# Patient Record
Sex: FEMALE | Race: BLACK OR AFRICAN AMERICAN | Employment: FULL TIME | URBAN - METROPOLITAN AREA
[De-identification: names, ages, dates, MRNs, and addresses within clinical notes are randomized per-mention and may not be internally consistent; named-entity substitution may affect disease eponyms.]

---

## 2017-05-11 NOTE — TELEPHONE ENCOUNTER
Pt appointment for physical rescheduled from 6/2/17 to 6/29/17 due to Dr. Prakash Cruz being on vacation. Please send refill to [facility] pharmacy.

## 2017-05-11 NOTE — TELEPHONE ENCOUNTER
From: Lauren Celaya  To: Juana Goins NP  Sent: 5/11/2017 10:25 AM EDT  Subject: Medication Renewal Request    Original authorizing provider: LAURA Shepard would like a refill of the following medications:  Abdiel Vidales 1/35, 28, 1-35 mg-mcg tab Juana Goins NP]    Preferred pharmacy: [facility]/PHARMACY #[id number] [address] AT CORNER OF [address] [place] [address]    Comment:

## 2017-05-12 NOTE — TELEPHONE ENCOUNTER
Pt called to check on status of refill request since she received 3 messages from [address] and 2 of them said her prescription had been refused. Pt advised one of them may have been refused by NP since it was sent to him in err and Dr. Rosalia Ward may have refused initial refill due to not realizing her appointment has already been scheduled for annual physical but was approved today.

## 2017-06-29 ENCOUNTER — OFFICE VISIT (OUTPATIENT)
Dept: FAMILY MEDICINE CLINIC | Age: 52
End: 2017-06-29

## 2017-06-29 VITALS
HEART RATE: 70 BPM | SYSTOLIC BLOOD PRESSURE: 137 MMHG | HEIGHT: 66 IN | RESPIRATION RATE: 20 BRPM | DIASTOLIC BLOOD PRESSURE: 81 MMHG | WEIGHT: 165 LBS | TEMPERATURE: 98 F | BODY MASS INDEX: 26.52 KG/M2

## 2017-06-29 DIAGNOSIS — E78.00 HYPERCHOLESTEREMIA: ICD-10-CM

## 2017-06-29 DIAGNOSIS — D50.9 IRON DEFICIENCY ANEMIA, UNSPECIFIED IRON DEFICIENCY ANEMIA TYPE: ICD-10-CM

## 2017-06-29 DIAGNOSIS — Z23 ENCOUNTER FOR IMMUNIZATION: ICD-10-CM

## 2017-06-29 DIAGNOSIS — R10.10 PAIN OF UPPER ABDOMEN: ICD-10-CM

## 2017-06-29 DIAGNOSIS — I10 BENIGN ESSENTIAL HTN: ICD-10-CM

## 2017-06-29 DIAGNOSIS — Z12.11 SCREEN FOR COLON CANCER: ICD-10-CM

## 2017-06-29 DIAGNOSIS — Z01.419 ENCOUNTER FOR GYNECOLOGICAL EXAMINATION WITHOUT ABNORMAL FINDING: Primary | ICD-10-CM

## 2017-06-29 NOTE — PROGRESS NOTES
Subjective:   Breanna Rich is a 49 y.o. y.o. female here for her annual routine Pap and checkup. She has been having upper, dull, abdominal pain for about 3 weeks to a month. It happens about once a week, usually when she eats greasy foods. No radiation. It is described as a discomfort. The pain is getting better. Patient's last menstrual period was 06/05/2017. Social History: single partner, contraception - OCP (Oral Contraceptive Pills). Pertinent past medical hstory: hypertension, no history of DVT, CAD, DM, liver disease, migraines or smoking. Health Habits/Lifestyle  Occupation:    Household members:  3, patient, spouse, son  Doing a monthly breast self exam:  no  Last dental appointment:   May 24th  Last eye exam:  April 10th  Last colonoscopy:  never  Uses seatbelts regularly :  yes  Getting regular exercise:  yes  Last mammogram:  This past October    Patient Active Problem List    Diagnosis Date Noted    Overweight 02/15/2016    Benign essential HTN 06/10/2015    Sickle cell trait ([facility] [address] 11/28/2012    Allergic rhinitis, cause unspecified 11/28/2012    Iron deficiency anemia 11/28/2012    Hypercholesteremia 09/05/2012    Asthma 09/05/2012     Current Outpatient Prescriptions   Medication Sig Dispense Refill    norethindrone-ethinyl estradiol (ALYACEN 1/35, 28,) 1-35 mg-mcg tab Take 1 Tab by mouth daily. 84 Tab 0    hydroCHLOROthiazide (MICROZIDE) 12.5 mg capsule TAKE ONE CAPSULE BY MOUTH DAILY 90 Cap 3    FOLIC ACID/MULTIVIT-MINERALS (DAILY GUMMIES PO) Take  by mouth daily.  LACTOBAC CMB #3/FOS/PANTETHINE (PROBIOTIC & ACIDOPHILUS PO) Take  by mouth.  fluticasone (FLONASE) 50 mcg/actuation nasal spray 2 Sprays by Both Nostrils route daily. 1 Bottle 11    albuterol (PROAIR HFA) 90 mcg/actuation inhaler Take 2 Puffs by inhalation every four (4) hours as needed for Wheezing.  1 Inhaler 3     Allergies   Allergen Reactions    Latex Rash    Papaya Swelling Swelling of lips.  Sulfa (Sulfonamide Antibiotics) Swelling     Past Medical History:   Diagnosis Date    Allergic rhinitis, cause unspecified 11/28/2012    Asthma 9/5/2012    Benign essential HTN 6/10/2015    Hypercholesteremia 9/5/2012    Iron deficiency anemia 11/28/2012    Overweight 2/15/2016    Sickle cell trait ([facility] [address] 11/28/2012     Past Surgical History:   Procedure Laterality Date    HX GYN      Cervical cryo     Family History   Problem Relation Age of Onset    Heart Disease Mother 79     CABG    Hypertension Mother     Asthma Brother     Cancer Maternal Grandmother      Ovarian     Social History   Substance Use Topics    Smoking status: Never Smoker    Smokeless tobacco: Never Used    Alcohol use No        ROS:  Feeling well. No dyspnea or chest pain on exertion. No change in bowel habits, black or bloody stools. No urinary tract symptoms. GYN ROS: normal menses, no abnormal bleeding, pelvic pain or discharge, no breast pain or new or enlarging lumps on self exam. No neurological complaints. Objective:  Visit Vitals    /81    Pulse 70    Temp 98 °F (36.7 °C) (Oral)    Resp 20    Ht 5' 5.75\" (1.67 m)    Wt 165 lb (74.8 kg)    LMP 06/05/2017    BMI 26.83 kg/m2     The patient appears well, alert, oriented x 3, in no distress. ENT normal.  Neck supple. No adenopathy or thyromegaly. PAVAN. Lungs are clear, good air entry, no wheezes, rhonchi or rales. S1 and S2 normal, no murmurs, regular rate and rhythm. Abdomen soft without tenderness, guarding, mass or organomegaly. Extremities show no edema, normal peripheral pulses. Neurological is normal, no focal findings. BREAST EXAM: breasts appear normal, no suspicious masses, no skin or nipple changes or axillary nodes    PELVIC EXAM: normal external genitalia, vulva, vagina, cervix, uterus and adnexa  Rectal - normal rectal, no masses      Assessment/Plan:    ICD-10-CM ICD-9-CM    1.  Encounter for gynecological examination without abnormal finding Z01.419 V72.31 PAP IG, HPV AND RFX HPV 64/41,35([id number])   2. Pain of upper abdomen R10.10 789.09 US ABD COMP   3. Screen for colon cancer Z12.11 V76.51 OCCULT BLOOD, IMMUNOASSAY (FIT)      REFERRAL TO GASTROENTEROLOGY   4. Encounter for immunization Z23 V03.89 diph,Pertuss,Acell,,Tet Vac-PF (ADACEL) 2 Lf-(2.5-5-3-5 mcg)-5Lf/0.5 mL susp      pneumococcal 23-valent (PNEUMOVAX 23) 25 mcg/0.5 mL injection   5. Benign essential HTN S89 407.3 METABOLIC PANEL, COMPREHENSIVE   6. Hypercholesteremia V40.11 028.4 METABOLIC PANEL, COMPREHENSIVE      LIPID PANEL   7. Iron deficiency anemia, unspecified iron deficiency anemia type D50.9 280.9 CBC WITH AUTOMATED DIFF         Breast awareness  Colonoscopy  Labs per orders. Abdominal ultrasound  TDAP and Pneumovax at pharmacy    Follow-up Disposition:  Return in about 6 months (around 12/29/2017) for blood pressure. .      Reviewed plan of care. Patient has provided input and agrees with goals.

## 2017-06-29 NOTE — MR AVS SNAPSHOT
Visit Information Date & Time Provider Department Dept. Phone Encounter #  
 6/29/2017  8:15 AM Andrea FarrellElenita 34 [id number] Follow-up Instructions Return in about 6 months (around 12/29/2017) for blood pressure. Upcoming Health Maintenance Date Due Pneumococcal 19-64 Medium Risk (1 of 1 - PPSV23) 9/16/1985 DTaP/Tdap/Td series (1 - Tdap) 9/16/1987 FOBT Q 1 YEAR AGE 50-75 5/31/2017 INFLUENZA AGE 9 TO ADULT 8/1/2017 BREAST CANCER SCRN MAMMOGRAM 10/27/2018 PAP AKA CERVICAL CYTOLOGY 5/31/2019 Allergies as of 6/29/2017  Review Complete On: 6/29/2017 By: Andrea Farrell MD  
  
 Severity Noted Reaction Type Reactions Latex  01/20/2012    Rash Papaya  01/20/2012    Swelling Swelling of lips. Sulfa (Sulfonamide Antibiotics)  01/20/2012    Swelling Current Immunizations  Never Reviewed No immunizations on file. Not reviewed this visit You Were Diagnosed With   
  
 Codes Comments Encounter for gynecological examination without abnormal finding    -  Primary ICD-10-CM: X75.936 ICD-9-CM: V72.31 Pain of upper abdomen     ICD-10-CM: R10.10 ICD-9-CM: 789.09 Screen for colon cancer     ICD-10-CM: Z12.11 ICD-9-CM: V76.51 Encounter for immunization     ICD-10-CM: O40 ICD-9-CM: V03.89 Benign essential HTN     ICD-10-CM: I10 
ICD-9-CM: 401.1 Hypercholesteremia     ICD-10-CM: E78.00 ICD-9-CM: 272.0 Iron deficiency anemia, unspecified iron deficiency anemia type     ICD-10-CM: D50.9 ICD-9-CM: 280.9 Vitals BP Pulse Temp Resp Height(growth percentile) Weight(growth percentile) 137/81 70 98 °F (36.7 °C) (Oral) 20 5' 5.75\" (1.67 m) 165 lb (74.8 kg) LMP BMI OB Status Smoking Status 06/05/2017 26.83 kg/m2 Having regular periods Never Smoker Vitals History BMI and BSA Data  Body Mass Index Body Surface Area  
 26.83 kg/m 2 1.86 m 2  
  
  
 Preferred Pharmacy Pharmacy Name Phone [facility]/PHARMACY #[id number]- [place] [id number] [facility] AT [address] [place] [address] [phone] Your Updated Medication List  
  
   
This list is accurate as of: 17  9:11 AM.  Always use your most recent med list.  
  
  
  
  
 albuterol 90 mcg/actuation inhaler Commonly known as:  PROAIR HFA Take 2 Puffs by inhalation every four (4) hours as needed for Wheezing. DAILY GUMMIES PO Take  by mouth daily. diph,Pertuss(Acell),Tet Vac-PF 2 Lf-(2.5-5-3-5 mcg)-5Lf/0.5 mL susp Commonly known as:  ADACEL  
0.5 mL by IntraMUSCular route once for 1 dose. fluticasone 50 mcg/actuation nasal spray Commonly known as:  Williamson Petite 2 Sprays by Both Nostrils route daily. hydroCHLOROthiazide 12.5 mg capsule Commonly known as:  Velton Shadow TAKE ONE CAPSULE BY MOUTH DAILY  
  
 norethindrone-ethinyl estradiol 1-35 mg-mcg Tab Commonly known as:  ALYACEN 1/35 (28) Take 1 Tab by mouth daily. pneumococcal 23-valent 25 mcg/0.5 mL injection Commonly known as:  PNEUMOVAX 23  
0.5 mL by IntraMUSCular route once for 1 dose. PROBIOTIC & ACIDOPHILUS PO Take  by mouth. Prescriptions Sent to Pharmacy Refills diph,Pertuss,Acell,,Tet Vac-PF (ADACEL) 2 Lf-(2.5-5-3-5 mcg)-5Lf/0.5 mL susp 0 Si.5 mL by IntraMUSCular route once for 1 dose. Class: Normal  
 Pharmacy: [facility]/pharmacy #[id number] [address] [facility] AT [address] [place] [address] Ph #: [phone] Route: IntraMUSCular  
 pneumococcal 23-valent (PNEUMOVAX 23) 25 mcg/0.5 mL injection 0 Si.5 mL by IntraMUSCular route once for 1 dose. Class: Normal  
 Pharmacy: [facility]/pharmacy #[id number] [address] [facility] AT [address] [place] [address] Ph #: [phone] Route: IntraMUSCular We Performed the Following CBC WITH AUTOMATED DIFF [97552 CPT(R)] LIPID PANEL [16031 CPT(R)] METABOLIC PANEL, COMPREHENSIVE [08177 CPT(R)] OCCULT BLOOD, IMMUNOASSAY (FIT) [id number] CPT(R)] REFERRAL TO GASTROENTEROLOGY [GGB06 Custom] Comments:  
 Please evaluate patient for screening colonoscopy. Follow-up Instructions Return in about 6 months (around 12/29/2017) for blood pressure. To-Do List   
 Around 07/05/2017 Imaging:  US ABD COMP Referral Information Referral ID Referred By Referred To  
  
 [id number] [facility] [address] [place] [address],Third Floor, [place] [address] Phone: [phone] Fax: [phone] Visits Status Start Date End Date 1 New Request 6/29/17 6/29/18 If your referral has a status of pending review or denied, additional information will be sent to support the outcome of this decision. Introducing [facility] & HEALTH SERVICES! Dear Reva Tran: Thank you for requesting a [employer] account. Our records indicate that you already have an active [email or web address] account. You can access your account anytime at [email or web address] Did you know that you can access your hospital and ER discharge instructions at any time in [employer]ails.[employer]? You can also review all of your test results from your hospital stay or ER visit. Additional Information If you have questions, please visit the Frequently Asked Questions section of the [email or web address] website at [email or web address] Remember, Tails.com is NOT to be used for urgent needs. For medical emergencies, dial 911. Now available from your iPhone and Android! Please provide this summary of care documentation to your next provider. Your primary care clinician is listed as Alberto Espinoza. If you have any questions after today's visit, please call [phone].

## 2017-06-29 NOTE — PROGRESS NOTES
Chief Complaint   Patient presents with    Well Woman     with PAP    Abdominal Pain     Health Maintenance   Topic Date Due    Pneumococcal 19-64 Medium Risk (1 of 1 - PPSV23) 09/16/1985    DTaP/Tdap/Td series (1 - Tdap) 09/16/1987    FOBT Q 1 YEAR AGE 50-75  05/31/2017    INFLUENZA AGE 9 TO ADULT  08/01/2017    BREAST CANCER SCRN MAMMOGRAM  10/27/2018    PAP AKA CERVICAL CYTOLOGY  05/31/2019     1. Have you been to the ER, urgent care clinic since your last visit? Hospitalized since your last visit? No    2. Have you seen or consulted any other health care providers outside of the [address] [place] [address] since your last visit? Include any pap smears or colon screening.  No

## 2017-06-30 LAB
ALBUMIN SERPL-MCNC: 3.7 G/DL (ref 3.5–5.5)
ALBUMIN/GLOB SERPL: 1.1 {RATIO} (ref 1.2–2.2)
ALP SERPL-CCNC: 81 IU/L (ref 39–117)
ALT SERPL-CCNC: 12 IU/L (ref 0–32)
AST SERPL-CCNC: 18 IU/L (ref 0–40)
BASOPHILS # BLD AUTO: 0 X10E3/UL (ref 0–0.2)
BASOPHILS NFR BLD AUTO: 1 %
BILIRUB SERPL-MCNC: 0.2 MG/DL (ref 0–1.2)
BUN SERPL-MCNC: 10 MG/DL (ref 6–24)
BUN/CREAT SERPL: 13 (ref 9–23)
CALCIUM SERPL-MCNC: 8.6 MG/DL (ref 8.7–10.2)
CHLORIDE SERPL-SCNC: 102 MMOL/L (ref 96–106)
CHOLEST SERPL-MCNC: 201 MG/DL (ref 100–199)
CO2 SERPL-SCNC: 23 MMOL/L (ref 18–29)
CREAT SERPL-MCNC: 0.78 MG/DL (ref 0.57–1)
EOSINOPHIL # BLD AUTO: 0.2 X10E3/UL (ref 0–0.4)
EOSINOPHIL NFR BLD AUTO: 4 %
ERYTHROCYTE [DISTWIDTH] IN BLOOD BY AUTOMATED COUNT: 15.8 % (ref 12.3–15.4)
GLOBULIN SER CALC-MCNC: 3.3 G/DL (ref 1.5–4.5)
GLUCOSE SERPL-MCNC: 83 MG/DL (ref 65–99)
HCT VFR BLD AUTO: 38 % (ref 34–46.6)
HDLC SERPL-MCNC: 74 MG/DL
HGB BLD-MCNC: 12 G/DL (ref 11.1–15.9)
IMM GRANULOCYTES # BLD: 0 X10E3/UL (ref 0–0.1)
IMM GRANULOCYTES NFR BLD: 0 %
INTERPRETATION, 910389: NORMAL
LDLC SERPL CALC-MCNC: 113 MG/DL (ref 0–99)
LYMPHOCYTES # BLD AUTO: 1.4 X10E3/UL (ref 0.7–3.1)
LYMPHOCYTES NFR BLD AUTO: 21 %
MCH RBC QN AUTO: 22.6 PG (ref 26.6–33)
MCHC RBC AUTO-ENTMCNC: 31.6 G/DL (ref 31.5–35.7)
MCV RBC AUTO: 72 FL (ref 79–97)
MONOCYTES # BLD AUTO: 0.4 X10E3/UL (ref 0.1–0.9)
MONOCYTES NFR BLD AUTO: 6 %
NEUTROPHILS # BLD AUTO: 4.5 X10E3/UL (ref 1.4–7)
NEUTROPHILS NFR BLD AUTO: 68 %
PLATELET # BLD AUTO: 278 X10E3/UL (ref 150–379)
POTASSIUM SERPL-SCNC: 3.8 MMOL/L (ref 3.5–5.2)
PROT SERPL-MCNC: 7 G/DL (ref 6–8.5)
RBC # BLD AUTO: 5.3 X10E6/UL (ref 3.77–5.28)
SODIUM SERPL-SCNC: 140 MMOL/L (ref 134–144)
TRIGL SERPL-MCNC: 71 MG/DL (ref 0–149)
VLDLC SERPL CALC-MCNC: 14 MG/DL (ref 5–40)
WBC # BLD AUTO: 6.6 X10E3/UL (ref 3.4–10.8)

## 2017-07-04 LAB — HEMOCCULT STL QL IA: POSITIVE

## 2017-07-07 LAB
CYTOLOGIST CVX/VAG CYTO: NORMAL
CYTOLOGY CVX/VAG DOC THIN PREP: NORMAL
DX ICD CODE: NORMAL
HPV I/H RISK 1 DNA CVX QL PROBE+SIG AMP: NEGATIVE
Lab: NORMAL
Lab: NORMAL
OTHER STN SPEC: NORMAL
PATH REPORT.FINAL DX SPEC: NORMAL
STAT OF ADQ CVX/VAG CYTO-IMP: NORMAL

## 2017-07-12 ENCOUNTER — HOSPITAL ENCOUNTER (OUTPATIENT)
Dept: ULTRASOUND IMAGING | Age: 52
Discharge: HOME | End: 2017-07-12
Attending: FAMILY MEDICINE
Payer: COMMERCIAL

## 2017-07-12 DIAGNOSIS — R10.10 PAIN OF UPPER ABDOMEN: ICD-10-CM

## 2017-07-12 PROCEDURE — 76700 US EXAM ABDOM COMPLETE: CPT

## 2017-07-26 LAB — COLONOSCOPY, EXTERNAL: NORMAL

## 2017-08-04 RX ORDER — NORETHINDRONE AND ETHINYL ESTRADIOL 1 MG-35MCG
KIT ORAL
Qty: 84 TAB | Refills: 3 | Status: SHIPPED | OUTPATIENT
Start: 2017-08-04 | End: 2017-08-22 | Stop reason: SDUPTHER

## 2017-08-04 NOTE — TELEPHONE ENCOUNTER
From: Junnie Seip  To: Jackson Appiah MD  Sent: 8/4/2017 2:42 PM EDT  Subject: Medication Renewal Request    Original authorizing provider: MD Jamari Reynaga Emma Carvajal would like a refill of the following medications:  norethindrone-ethinyl estradiol (ALYACEN 1/35, 28,) 1-35 mg-mcg tab Jackson Appiah MD]    Preferred pharmacy: [facility]/PHARMACY #[id number] - [place], [address] [facility] AT CORNER OF [facility] [address]    Comment:

## 2017-11-03 ENCOUNTER — HOSPITAL ENCOUNTER (OUTPATIENT)
Dept: MAMMOGRAPHY | Age: 52
Discharge: HOME | End: 2017-11-03
Attending: FAMILY MEDICINE
Payer: COMMERCIAL

## 2017-11-03 DIAGNOSIS — Z12.39 SCREENING BREAST EXAMINATION: ICD-10-CM

## 2017-11-03 PROCEDURE — 77067 SCR MAMMO BI INCL CAD: CPT

## 2018-03-08 ENCOUNTER — OFFICE VISIT (OUTPATIENT)
Dept: FAMILY MEDICINE CLINIC | Age: 53
End: 2018-03-08

## 2018-03-08 VITALS
SYSTOLIC BLOOD PRESSURE: 140 MMHG | HEIGHT: 66 IN | DIASTOLIC BLOOD PRESSURE: 80 MMHG | BODY MASS INDEX: 26.84 KG/M2 | RESPIRATION RATE: 18 BRPM | TEMPERATURE: 98 F | WEIGHT: 167 LBS | HEART RATE: 73 BPM

## 2018-03-08 DIAGNOSIS — I10 BENIGN ESSENTIAL HTN: ICD-10-CM

## 2018-03-08 RX ORDER — HYDROCHLOROTHIAZIDE 25 MG/1
25 TABLET ORAL DAILY
Qty: 30 TAB | Refills: 1 | Status: SHIPPED | OUTPATIENT
Start: 2018-03-08 | End: 2018-04-24 | Stop reason: SDUPTHER

## 2018-03-08 NOTE — PROGRESS NOTES
Chief Complaint   Patient presents with    Hypertension     follow up      Health Maintenance   Topic Date Due    Pneumococcal 19-64 Medium Risk (1 of 1 - PPSV23) 09/16/1985    DTaP/Tdap/Td series (1 - Tdap) 09/16/1987    Influenza Age 9 to Adult  08/01/2017    BREAST CANCER SCRN MAMMOGRAM  11/03/2019    PAP AKA CERVICAL CYTOLOGY  06/29/2020    COLONOSCOPY  07/26/2022

## 2018-03-08 NOTE — MR AVS SNAPSHOT
[address] 
[phone] Patient: Frankie Moore MRN: [id number] :1965 Visit Information Date & Time Provider Department Dept. Phone Encounter #  
 3/8/2018  8:45 AM Elenita Irwin 34 [id number] Follow-up Instructions Return in about 6 weeks (around 2018) for blood pressure. Upcoming Health Maintenance Date Due Pneumococcal 19-64 Medium Risk (1 of 1 - PPSV23) 1985 DTaP/Tdap/Td series (1 - Tdap) 1987 Influenza Age 5 to Adult 2017 BREAST CANCER SCRN MAMMOGRAM 11/3/2019 PAP AKA CERVICAL CYTOLOGY 2020 COLONOSCOPY 2022 Allergies as of 3/8/2018  Review Complete On: 3/8/2018 By: Didi Purcell MD  
  
 Severity Noted Reaction Type Reactions Latex  2012    Rash Papaya  2012    Swelling Swelling of lips. Sulfa (Sulfonamide Antibiotics)  2012    Swelling Current Immunizations  Never Reviewed No immunizations on file. Not reviewed this visit You Were Diagnosed With   
  
 Codes Comments Benign essential HTN     ICD-10-CM: I10 
ICD-9-CM: 401.1 Vitals BP Pulse Temp Resp Height(growth percentile) Weight(growth percentile) 140/80 73 98 °F (36.7 °C) (Oral) 18 5' 5.75\" (1.67 m) 167 lb (75.8 kg) BMI OB Status Smoking Status 27.16 kg/m2 Having regular periods Never Smoker Vitals History BMI and BSA Data Body Mass Index Body Surface Area  
 27.16 kg/m 2 1.88 m 2 Preferred Pharmacy Pharmacy Name Phone [id number]- [facility] [address] [facility] AT [address] [place] [address] [phone] Your Updated Medication List  
  
   
This list is accurate as of 3/8/18  8:50 AM.  Always use your most recent med list.  
  
  
  
  
 albuterol 90 mcg/actuation inhaler Commonly known as:  PROAIR HFA  
 Take 2 Puffs by inhalation every four (4) hours as needed for Wheezing. DAILY GUMMIES PO Take  by mouth daily. fluticasone 50 mcg/actuation nasal spray Commonly known as:  Marlaine Romance 2 Sprays by Both Nostrils route daily. hydroCHLOROthiazide 25 mg tablet Commonly known as:  HYDRODIURIL Take 1 Tab by mouth daily. norethindrone-ethinyl estradiol 1-35 mg-mcg Tab Commonly known as:  ALYACEN 1/35 (28) Take 1 Tab by mouth daily. PROBIOTIC & ACIDOPHILUS PO Take  by mouth. Prescriptions Sent to Pharmacy Refills  
 hydroCHLOROthiazide (HYDRODIURIL) 25 mg tablet 1 Sig: Take 1 Tab by mouth daily. Class: Normal  
 Pharmacy: [facility]/pharmacy #[id number] AT [address] [place] [address] #: [phone] Route: Oral  
  
We Performed the Following METABOLIC PANEL, BASIC [20842 CPT(R)] Follow-up Instructions Return in about 6 weeks (around 4/19/2018) for blood pressure. Introducing [facility] & HEALTH SERVICES! Dear Keyana Cornelius: Thank you for requesting a [employer] account. Our records indicate that you already have an active [employer] account. You can access your account anytime at [email or web address]. "[employer] Did you know that you can access your hospital and ER discharge instructions at any time in [employer]? You can also review all of your test results from your hospital stay or ER visit. Additional Information If you have questions, please visit the Frequently Asked Questions section of the [employer] website at [email or web address]. "[employer]/. Remember, [employer] is NOT to be used for urgent needs. For medical emergencies, dial 911. Now available from your iPhone and Android! Please provide this summary of care documentation to your next provider. Your primary care clinician is listed as Wei Tavares. If you have any questions after today's visit, please call [phone].

## 2018-03-08 NOTE — PROGRESS NOTES
HISTORY OF PRESENT ILLNESS  Iyv Pierre is a 47 y.o. female. Hypertension   The history is provided by the patient. This is a chronic problem. The current episode started more than 1 week ago. The problem occurs constantly. The problem has been gradually worsening. Pertinent negatives include no chest pain, no abdominal pain, no headaches and no shortness of breath. Nothing aggravates the symptoms. The symptoms are relieved by medications. Treatments tried: HCTZ. The treatment provided moderate relief. Review of Systems   Constitutional: Negative for weight loss. No weight gain   Eyes: Negative for blurred vision. Respiratory: Negative for shortness of breath. Cardiovascular: Negative for chest pain and leg swelling. Gastrointestinal: Negative for abdominal pain. Neurological: Negative for dizziness, sensory change, speech change, focal weakness and headaches. Visit Vitals    /80  Comment: right arm, manual cuff    Pulse 73    Temp 98 °F (36.7 °C) (Oral)    Resp 18    Ht 5' 5.75\" (1.67 m)    Wt 167 lb (75.8 kg)    BMI 27.16 kg/m2     BP Readings from Last 3 Encounters:   03/08/18 143/88   06/29/17 137/81   12/02/16 136/83     Physical Exam   Constitutional: She is oriented to person, place, and time. She appears well-developed and well-nourished. No distress. Cardiovascular: Normal rate, regular rhythm and normal heart sounds. Exam reveals no gallop and no friction rub. No murmur heard. Pulmonary/Chest: Effort normal and breath sounds normal. No respiratory distress. She has no wheezes. She has no rales. Musculoskeletal: She exhibits no edema. Neurological: She is alert and oriented to person, place, and time. Skin: Skin is warm and dry. She is not diaphoretic. Nursing note and vitals reviewed. ASSESSMENT and PLAN    ICD-10-CM ICD-9-CM    1.  Benign essential HTN I10 401.1 hydroCHLOROthiazide (HYDRODIURIL) 25 mg tablet      METABOLIC PANEL, BASIC Blood pressure mildly elevated  Increase hydrochlorothiazide  Labs per orders. Follow-up Disposition:  Return in about 6 weeks (around 4/19/2018) for blood pressure. Reviewed plan of care. Patient has provided input and agrees with goals.

## 2018-03-09 LAB
BUN SERPL-MCNC: 10 MG/DL (ref 6–24)
BUN/CREAT SERPL: 11 (ref 9–23)
CALCIUM SERPL-MCNC: 9 MG/DL (ref 8.7–10.2)
CHLORIDE SERPL-SCNC: 98 MMOL/L (ref 96–106)
CO2 SERPL-SCNC: 21 MMOL/L (ref 18–29)
CREAT SERPL-MCNC: 0.92 MG/DL (ref 0.57–1)
GFR SERPLBLD CREATININE-BSD FMLA CKD-EPI: 72 ML/MIN/1.73
GFR SERPLBLD CREATININE-BSD FMLA CKD-EPI: 83 ML/MIN/1.73
GLUCOSE SERPL-MCNC: 63 MG/DL (ref 65–99)
POTASSIUM SERPL-SCNC: 4.5 MMOL/L (ref 3.5–5.2)
SODIUM SERPL-SCNC: 139 MMOL/L (ref 134–144)

## 2018-04-24 ENCOUNTER — OFFICE VISIT (OUTPATIENT)
Dept: FAMILY MEDICINE CLINIC | Age: 53
End: 2018-04-24

## 2018-04-24 VITALS
SYSTOLIC BLOOD PRESSURE: 129 MMHG | RESPIRATION RATE: 16 BRPM | HEIGHT: 66 IN | TEMPERATURE: 97 F | DIASTOLIC BLOOD PRESSURE: 87 MMHG | BODY MASS INDEX: 26.71 KG/M2 | WEIGHT: 166.2 LBS | HEART RATE: 73 BPM

## 2018-04-24 DIAGNOSIS — I10 BENIGN ESSENTIAL HTN: ICD-10-CM

## 2018-04-24 RX ORDER — HYDROCHLOROTHIAZIDE 25 MG/1
25 TABLET ORAL DAILY
Qty: 30 TAB | Refills: 11 | Status: SHIPPED | OUTPATIENT
Start: 2018-04-24 | End: 2019-02-21 | Stop reason: SDUPTHER

## 2018-04-24 NOTE — MR AVS SNAPSHOT
[address] 
[phone] Patient: Andrés Camarena MRN: [id number] :1965 Visit Information Date & Time Provider Department Dept. Phone Encounter #  
 2018  8:00 AM Elenita Joseph 34 [id number] Upcoming Health Maintenance Date Due Pneumococcal 19-64 Medium Risk (1 of 1 - PPSV23) 2018* DTaP/Tdap/Td series (1 - Tdap) 2018* BREAST CANCER SCRN MAMMOGRAM 11/3/2019 PAP AKA CERVICAL CYTOLOGY 2020 COLONOSCOPY 2022 *Topic was postponed. The date shown is not the original due date. Allergies as of 2018  Review Complete On: 2018 By: Cleo Morris MD  
  
 Severity Noted Reaction Type Reactions Latex  2012    Rash Papaya  2012    Swelling Swelling of lips. Sulfa (Sulfonamide Antibiotics)  2012    Swelling Current Immunizations  Never Reviewed No immunizations on file. Not reviewed this visit You Were Diagnosed With   
  
 Codes Comments Benign essential HTN     ICD-10-CM: I10 
ICD-9-CM: 401.1 Vitals BP Pulse Temp Resp Height(growth percentile) Weight(growth percentile) 129/87 (BP 1 Location: Left arm, BP Patient Position: Sitting) 73 97 °F (36.1 °C) (Oral) 16 5' 5.75\" (1.67 m) 166 lb 3.2 oz (75.4 kg) BMI OB Status Smoking Status 27.03 kg/m2 Having regular periods Never Smoker Vitals History BMI and BSA Data Body Mass Index Body Surface Area  
 27.03 kg/m 2 1.87 m 2 Preferred Pharmacy [facility] Name Phone [id number] [place] [id number] [facility] AT [address] [place] [address] [phone] Your Updated Medication List  
  
   
This list is accurate as of 18  8:35 AM.  Always use your most recent med list.  
  
  
  
  
 albuterol 90 mcg/actuation inhaler Commonly known as:  PROAIR HFA  
 Take 2 Puffs by inhalation every four (4) hours as needed for Wheezing. DAILY GUMMIES PO Take  by mouth daily. fluticasone 50 mcg/actuation nasal spray Commonly known as:  Melecio Guerreroer 2 Sprays by Both Nostrils route daily. hydroCHLOROthiazide 25 mg tablet Commonly known as:  HYDRODIURIL Take 1 Tab by mouth daily. norethindrone-ethinyl estradiol 1-35 mg-mcg Tab Commonly known as:  ALYACEN 1/35 (28) Take 1 Tab by mouth daily. PROBIOTIC & ACIDOPHILUS PO Take  by mouth. Prescriptions Sent to Pharmacy Refills  
 hydroCHLOROthiazide (HYDRODIURIL) 25 mg tablet 11 Sig: Take 1 Tab by mouth daily. Class: Normal  
 Pharmacy: [facility]/pharmacy #[id number] [place] [address] AT [address] [place] [address] #: [phone] Route: Oral  
  
We Performed the Following METABOLIC PANEL, BASIC [44472 CPT(R)] Introducing [facility] & [facility]! Dear Malik Orona: Thank you for requesting a [employer] account. Our records indicate that you already have an active [employer] account. You can access your account anytime at [email or web address] Did you know that you can access your hospital and ER discharge instructions at any time in [employer]? You can also review all of your test results from your hospital stay or ER visit. Additional Information If you have questions, please visit the Frequently Asked Questions section of the [employer] website at [email or web address] Remember, [employer] is NOT to be used for urgent needs. For medical emergencies, dial 911. Now available from your iPhone and Android! Please provide this summary of care documentation to your next provider. Your primary care clinician is listed as Gwen Tineo. If you have any questions after today's visit, please call [phone].

## 2018-04-24 NOTE — PROGRESS NOTES
HISTORY OF PRESENT ILLNESS  Jannifer Boas is a 47 y.o. female. Hypertension   The history is provided by the patient. This is a chronic problem. The current episode started more than 1 week ago. The problem occurs constantly. The problem has been gradually improving. Pertinent negatives include no chest pain, no abdominal pain, no headaches and no shortness of breath. Nothing aggravates the symptoms. The symptoms are relieved by medications. Review of Systems   Constitutional: Negative for weight loss. No weight gain   Eyes: Negative for blurred vision. Respiratory: Negative for shortness of breath. Cardiovascular: Negative for chest pain and leg swelling. Gastrointestinal: Negative for abdominal pain. Neurological: Negative for dizziness, sensory change, speech change, focal weakness and headaches. Visit Vitals    /87 (BP 1 Location: Left arm, BP Patient Position: Sitting)    Pulse 73    Temp 97 °F (36.1 °C) (Oral)    Resp 16    Ht 5' 5.75\" (1.67 m)    Wt 166 lb 3.2 oz (75.4 kg)    BMI 27.03 kg/m2     BP Readings from Last 3 Encounters:   04/24/18 129/87   03/08/18 140/80   06/29/17 137/81     Physical Exam   Constitutional: She is oriented to person, place, and time. She appears well-developed and well-nourished. No distress. Cardiovascular: Normal rate, regular rhythm and normal heart sounds. Exam reveals no gallop and no friction rub. No murmur heard. Pulmonary/Chest: Effort normal and breath sounds normal. No respiratory distress. She has no wheezes. She has no rales. Musculoskeletal: She exhibits no edema. Neurological: She is alert and oriented to person, place, and time. Skin: Skin is warm and dry. She is not diaphoretic. Nursing note and vitals reviewed. ASSESSMENT and PLAN    ICD-10-CM ICD-9-CM    1.  Benign essential HTN I10 401.1 hydroCHLOROthiazide (HYDRODIURIL) 25 mg tablet      METABOLIC PANEL, BASIC        Blood pressure controlled  Labs per orders. Continue current plans. Follow-up Disposition:  Return in about 3 months (around 7/24/2018) for physical.      Reviewed plan of care. Patient has provided input and agrees with goals.

## 2018-04-24 NOTE — PROGRESS NOTES
Chief Complaint   Patient presents with    Hypertension     follow up      1. Have you been to the ER, urgent care clinic since your last visit? No. Hospitalized since your last visit? No.    2. Have you seen or consulted any other health care providers outside of the [facility] since your last visit? Include any pap smears or colon screening.  No.

## 2018-04-25 LAB
BUN SERPL-MCNC: 8 MG/DL (ref 6–24)
BUN/CREAT SERPL: 10 (ref 9–23)
CALCIUM SERPL-MCNC: 9.1 MG/DL (ref 8.7–10.2)
CHLORIDE SERPL-SCNC: 96 MMOL/L (ref 96–106)
CO2 SERPL-SCNC: 26 MMOL/L (ref 18–29)
CREAT SERPL-MCNC: 0.83 MG/DL (ref 0.57–1)
GFR SERPLBLD CREATININE-BSD FMLA CKD-EPI: 82 ML/MIN/1.73
GFR SERPLBLD CREATININE-BSD FMLA CKD-EPI: 94 ML/MIN/1.73
GLUCOSE SERPL-MCNC: 76 MG/DL (ref 65–99)
POTASSIUM SERPL-SCNC: 3.6 MMOL/L (ref 3.5–5.2)
SODIUM SERPL-SCNC: 137 MMOL/L (ref 134–144)

## 2018-08-08 ENCOUNTER — OFFICE VISIT (OUTPATIENT)
Dept: FAMILY MEDICINE CLINIC | Age: 53
End: 2018-08-08

## 2018-08-08 VITALS
WEIGHT: 168.2 LBS | SYSTOLIC BLOOD PRESSURE: 137 MMHG | HEIGHT: 65 IN | HEART RATE: 71 BPM | BODY MASS INDEX: 28.02 KG/M2 | TEMPERATURE: 97.7 F | DIASTOLIC BLOOD PRESSURE: 86 MMHG | RESPIRATION RATE: 18 BRPM

## 2018-08-08 DIAGNOSIS — Z12.11 SCREEN FOR COLON CANCER: ICD-10-CM

## 2018-08-08 DIAGNOSIS — Z01.419 WELL WOMAN EXAM WITH ROUTINE GYNECOLOGICAL EXAM: Primary | ICD-10-CM

## 2018-08-08 DIAGNOSIS — Z12.39 SCREENING FOR MALIGNANT NEOPLASM OF BREAST: ICD-10-CM

## 2018-08-08 DIAGNOSIS — J45.901 ASTHMA EXACERBATION, MILD: ICD-10-CM

## 2018-08-08 DIAGNOSIS — Z23 NEED FOR SHINGLES VACCINE: ICD-10-CM

## 2018-08-08 DIAGNOSIS — Z12.4 SCREENING FOR MALIGNANT NEOPLASM OF CERVIX: ICD-10-CM

## 2018-08-08 DIAGNOSIS — E66.3 OVERWEIGHT: ICD-10-CM

## 2018-08-08 DIAGNOSIS — I10 BENIGN ESSENTIAL HTN: ICD-10-CM

## 2018-08-08 DIAGNOSIS — E78.00 HYPERCHOLESTEREMIA: ICD-10-CM

## 2018-08-08 DIAGNOSIS — J30.2 SEASONAL ALLERGIC RHINITIS, UNSPECIFIED TRIGGER: ICD-10-CM

## 2018-08-08 DIAGNOSIS — D57.3 SICKLE CELL TRAIT (HCC): ICD-10-CM

## 2018-08-08 NOTE — PROGRESS NOTES
Chief Complaint   Patient presents with    Well Woman    Cough     chest congestion x3 days    Flank Pain     over left arm x 6 months

## 2018-08-08 NOTE — PATIENT INSTRUCTIONS
Pap Test: Care Instructions  Your Care Instructions    The Pap test (also called a Pap smear) is a screening test for cancer of the cervix, which is the lower part of the uterus that opens into the vagina. The test can help your doctor find early changes in the cells that could lead to cancer. The sample of cells taken during your test has been sent to a lab so that an expert can look at the cells. It usually takes a week or two to get the results back. Follow-up care is a key part of your treatment and safety. Be sure to make and go to all appointments, and call your doctor if you are having problems. It's also a good idea to know your test results and keep a list of the medicines you take. What do the results mean? · A normal result means that the test did not find any abnormal cells in the sample. · An abnormal result can mean many things. Most of these are not cancer. The results of your test may be abnormal because:  ¨ You have an infection of the vagina or cervix, such as a yeast infection. ¨ You have an IUD (intrauterine device for birth control). ¨ You have low estrogen levels after menopause that are causing the cells to change. ¨ You have cell changes that may be a sign of precancer or cancer. The results are ranked based on how serious the changes might be. There are many other reasons why you might not get a normal result. If the results were abnormal, you may need to get another test within a few weeks or months. If the results show changes that could be a sign of cancer, you may need a test called a colposcopy, which provides a more complete view of the cervix. Sometimes the lab cannot use the sample because it does not contain enough cells or was not preserved well. If so, you may need to have the test again. This is not common, but it does happen from time to time. When should you call for help?   Watch closely for changes in your health, and be sure to contact your doctor if:    · You have vaginal bleeding or pain for more than 2 days after the test. It is normal to have a small amount of bleeding for a day or two after the test.   Where can you learn more? Go to http://yifan-ryley.info/. Enter E389 in the search box to learn more about \"Pap Test: Care Instructions. \"  Current as of: May 12, 2017  Content Version: 11.7  © 3201-8016 [employer] Care instructions adapted under license by [employer] (which disclaims liability or warranty for this information). If you have questions about a medical condition or this instruction, always ask your healthcare professional. [address] any warranty or liability for your use of this information.

## 2018-08-08 NOTE — PROGRESS NOTES
Subjective:   47 y.o. female for Well Woman Check. She is premenopausal.  Social History: single partner, contraception - OCP (Oral Contraceptive Pills). Pertinent past medical hstory: hypertension. Lives in a house with her  and her son who is a [employer] student. She works as an  and enjoys her work. Tries to eat a healthy diet  She occasionally exercises, walking at lunch       Patient Active Problem List   Diagnosis Code    Hypercholesteremia E78.00    Asthma J45.909    Sickle cell trait ([facility]) D57.3    Allergic rhinitis, cause unspecified J30.9    Iron deficiency anemia D50.9    Benign essential HTN I10    Overweight E66.3     Current Outpatient Prescriptions   Medication Sig Dispense Refill    hydroCHLOROthiazide (HYDRODIURIL) 25 mg tablet Take 1 Tab by mouth daily. 30 Tab 11    norethindrone-ethinyl estradiol (ALYACEN 1/35, 28,) 1-35 mg-mcg tab Take 1 Tab by mouth daily. 84 Tab 3    FOLIC ACID/MULTIVIT-MINERALS (DAILY GUMMIES PO) Take  by mouth daily.  LACTOBAC CMB #3/FOS/PANTETHINE (PROBIOTIC & ACIDOPHILUS PO) Take  by mouth.  fluticasone (FLONASE) 50 mcg/actuation nasal spray 2 Sprays by Both Nostrils route daily. 1 Bottle 11    albuterol (PROAIR HFA) 90 mcg/actuation inhaler Take 2 Puffs by inhalation every four (4) hours as needed for Wheezing. 1 Inhaler 3     Allergies   Allergen Reactions    Latex Rash    Papaya Swelling     Swelling of lips.     Sulfa (Sulfonamide Antibiotics) Swelling     Past Medical History:   Diagnosis Date    Allergic rhinitis, cause unspecified 11/28/2012    Asthma 9/5/2012    Benign essential HTN 6/10/2015    Hypercholesteremia 9/5/2012    Iron deficiency anemia 11/28/2012    Overweight 2/15/2016    Sickle cell trait ([facility] 11/28/2012     Past Surgical History:   Procedure Laterality Date    HX GYN      Cervical cryo     Family History   Problem Relation Age of Onset    Heart Disease Mother 79     CABG    Hypertension Mother  Asthma Brother     Cancer Maternal Grandmother      Ovarian     Social History   Substance Use Topics    Smoking status: Never Smoker    Smokeless tobacco: Never Used    Alcohol use No        ROS:  Feeling well. No dyspnea or chest pain on exertion. No abdominal pain, change in bowel habits, black or bloody stools. No urinary tract symptoms. GYN ROS: normal menses, no abnormal bleeding, pelvic pain or discharge. Menopausal symptoms: none. No neurological complaints. Last Colonoscopy: last year  Last Mammogram: last year    Objective:     Visit Vitals    /86 (BP 1 Location: Left arm, BP Patient Position: Sitting)    Pulse 71    Temp 97.7 °F (36.5 °C) (Oral)    Resp 18    Ht 5' 5\" (1.651 m)    Wt 168 lb 3.2 oz (76.3 kg)    LMP 07/30/2018    BMI 27.99 kg/m2     The patient appears well, alert, oriented x 3, in no distress. ENT normal.  Neck supple. No adenopathy or thyromegaly. PAVAN. Lungs are clear, good air entry, no wheezes, rhonchi or rales. S1 and S2 normal, no murmurs, regular rate and rhythm. Abdomen soft without tenderness, guarding, mass or organomegaly. Extremities show no edema, normal peripheral pulses. Neurological is normal, no focal findings. BREAST EXAM: breasts appear normal, no suspicious masses, no skin or nipple changes or axillary nodes    PELVIC EXAM: normal external genitalia, vulva, vagina, cervix, uterus and adnexa, pap obtained. CHaperoned by Diony Saul LPN    Assessment/Plan:   well woman  no contraindication to continue use of oral contraceptives  mammogram  pap smear  additional lab tests per orders  return annually or prn    ICD-10-CM ICD-9-CM    1. Well woman exam with routine gynecological exam Z01.419 V72.31     [V72.31]   2. Need for shingles vaccine Z23 V04.89 varicella-zoster recombinant, PF, (SHINGRIX) 50 mcg/0.5 mL susr injection   3. Screening for malignant neoplasm of cervix Z12.4 V76.2 PAP, IG, RFX HPV ASCUS ([id number])   4.  Screening for malignant neoplasm of breast Z12.31 V76.10 [facility] MAMMO BI SCREENING INCL CAD   5. Screen for colon cancer Z12.11 V76.51    6. Asthma exacerbation, mild J45.901 493.92    7. Benign essential HTN I10 401.1 CBC WITH AUTOMATED DIFF      METABOLIC PANEL, COMPREHENSIVE   8. Seasonal allergic rhinitis, unspecified trigger J30.2 477.9    9. Sickle cell trait (HCC) D57.3 282.5 CBC WITH AUTOMATED DIFF   10. Hypercholesteremia E78.00 272.0 LIPID PANEL      METABOLIC PANEL, COMPREHENSIVE   11. Overweight W87.4 112.57 METABOLIC PANEL, COMPREHENSIVE     Orders Placed This Encounter    KEVIN SCREENING DIGITAL BILATERAL    CBC WITH AUTOMATED DIFF    LIPID PANEL    METABOLIC PANEL, COMPREHENSIVE    varicella-zoster recombinant, PF, (SHINGRIX) 50 mcg/0.5 mL susr injection    norethindrone-ethinyl estradiol (ALYACEN 1/35, 28,) 1-35 mg-mcg tab    PAP, IG, RFX HPV ASCUS ([id number])   Benign essential HTN  bp well controlled, will continue to monitor and continue with current medication. Due for routine labs as orderede    Seasonal allergic rhinitis  C./w otc allergy tab and flonse, if symptoms worsen or change return for re-eval of symptoms    Hypercholesteremia  Will recheck FLP and assess based on ascvd risk stratification    Sickle cell trait ([facility] [address]  Historically mildly low mcv without anemia most recently, will recheck cbc, patient previously saw heme but was told no longer needed to follow    Overweight  Encouraged on diet, exercise and weight loss strategies.  Logging exercise and intake can be informative and keep you honest, encouraged also to set herself a schedule to help keep exercise on track    Karen Luis MD  [facility]  08/08/18 10:14 AM

## 2018-08-08 NOTE — ASSESSMENT & PLAN NOTE
Encouraged on diet, exercise and weight loss strategies.  Logging exercise and intake can be informative and keep you honest, encouraged also to set herself a schedule to help keep exercise on track

## 2018-08-08 NOTE — ASSESSMENT & PLAN NOTE
Historically mildly low mcv without anemia most recently, will recheck cbc, patient previously saw heme but was told no longer needed to follow

## 2018-08-08 NOTE — ASSESSMENT & PLAN NOTE
bp well controlled, will continue to monitor and continue with current medication.  Due for routine labs as orderede

## 2018-08-08 NOTE — MR AVS SNAPSHOT
[address] 
[phone] Patient: Sherrill Morris MRN: [id number] :1965 Visit Information Date & Time Provider Department Dept. Phone Encounter #  
 2018  8:30 AM Ladean Lundborg, [address] [id number] Follow-up Instructions Return in 4 months (on 2018). Upcoming Health Maintenance Date Due Pneumococcal 19-64 Medium Risk (1 of 1 - PPSV23) 1985 DTaP/Tdap/Td series (1 - Tdap) 1987 Influenza Age 5 to Adult 2018 BREAST CANCER SCRN MAMMOGRAM 11/3/2019 PAP AKA CERVICAL CYTOLOGY 2020 COLONOSCOPY 2022 Allergies as of 2018  Review Complete On: 2018 By: Ladean Lundborg, MD  
  
 Severity Noted Reaction Type Reactions Latex  2012    Rash Papaya  2012    Swelling Swelling of lips. Sulfa (Sulfonamide Antibiotics)  2012    Swelling Current Immunizations  Never Reviewed No immunizations on file. Not reviewed this visit You Were Diagnosed With   
  
 Codes Comments Need for shingles vaccine    -  Primary ICD-10-CM: H41 ICD-9-CM: V04.89 Well woman exam with routine gynecological exam     ICD-10-CM: S84.708 ICD-9-CM: V72.31 [V72.31] Screening for malignant neoplasm of cervix     ICD-10-CM: Z12.4 ICD-9-CM: V76.2 Screening for malignant neoplasm of breast     ICD-10-CM: Z12.31 
ICD-9-CM: V76.10 Screen for colon cancer     ICD-10-CM: Z12.11 ICD-9-CM: V76.51 Asthma exacerbation, mild     ICD-10-CM: J45.901 ICD-9-CM: 361.43 Benign essential HTN     ICD-10-CM: I10 
ICD-9-CM: 401.1 Seasonal allergic rhinitis, unspecified trigger     ICD-10-CM: J30.2 ICD-9-CM: 477.9 S[facility] ([facility]     ICD-10-CM: D57.3 ICD-9-CM: 282.5 Hypercholesteremia     ICD-10-CM: E78.00 ICD-9-CM: 272.0 Overweight     ICD-10-CM: D74.9 ICD-9-CM: 278.02 Vitals BP Pulse Temp Resp Height(growth percentile) Weight(growth percentile) 137/86 (BP 1 Location: Left arm, BP Patient Position: Sitting) 71 97.7 °F (36.5 °C) (Oral) 18 5' 5\" (1.651 m) 168 lb 3.2 oz (76.3 kg) LMP BMI OB Status Smoking Status 2018 27.99 kg/m2 Having regular periods Never Smoker Vitals History BMI and BSA Data Body Mass Index Body Surface Area  
 27.99 kg/m 2 1.87 m 2 Preferred Pharmacy Pharmacy Name Phone [id number] [place] [place] [facility] [address] [facility] AT [address] [place] [address] [phone] Your Updated Medication List  
  
   
This list is accurate as of 18  9:11 AM.  Always use your most recent med list.  
  
  
  
  
 albuterol 90 mcg/actuation inhaler Commonly known as:  PROAIR HFA Take 2 Puffs by inhalation every four (4) hours as needed for Wheezing. DAILY GUMMIES PO Take  by mouth daily. fluticasone 50 mcg/actuation nasal spray Commonly known as:  Qing Martin 2 Sprays by Both Nostrils route daily. hydroCHLOROthiazide 25 mg tablet Commonly known as:  HYDRODIURIL Take 1 Tab by mouth daily. norethindrone-ethinyl estradiol 1-35 mg-mcg Tab Commonly known as:  ALYACEN 1/35 (28) Take 1 Tab by mouth daily. PROBIOTIC & ACIDOPHILUS PO Take  by mouth.  
  
 varicella-zoster recombinant (PF) 50 mcg/0.5 mL Susr injection Commonly known as:  SHINGRIX  
0.5 mL by IntraMUSCular route once for 1 dose. Prescriptions Printed Refills  
 varicella-zoster recombinant, PF, (SHINGRIX) 50 mcg/0.5 mL susr injection 1 Si.5 mL by IntraMUSCular route once for 1 dose. Class: Print Route: IntraMUSCular Prescriptions Sent to Pharmacy Refills  
 norethindrone-ethinyl estradiol (ALYACEN /35, 28,) 1-35 mg-mcg tab 3 Sig: Take 1 Tab by mouth daily.   
 Class: Normal  
 Pharmacy: [id number] [address] [facility] AT [address] [place] [address] #: [phone] Route: Oral  
  
We Performed the Following CBC WITH AUTOMATED DIFF [14892 CPT(R)] LIPID PANEL [86666 CPT(R)] METABOLIC PANEL, COMPREHENSIVE [10124 CPT(R)] PAP, IG, RFX HPV ASCUS (624356) [65145 CPT(R)] Follow-up Instructions Return in 4 months (on 12/8/2018). To-Do List   
 11/04/2018 Imaging:  [facility] MAMMO BI SCREENING INCL CAD Patient Instructions Pap Test: Care Instructions Your Care Instructions The Pap test (also called a Pap smear) is a screening test for cancer of the cervix, which is the lower part of the uterus that opens into the vagina. The test can help your doctor find early changes in the cells that could lead to cancer. The sample of cells taken during your test has been sent to a lab so that an expert can look at the cells. It usually takes a week or two to get the results back. Follow-up care is a key part of your treatment and safety. Be sure to make and go to all appointments, and call your doctor if you are having problems. It's also a good idea to know your test results and keep a list of the medicines you take. What do the results mean? · A normal result means that the test did not find any abnormal cells in the sample. · An abnormal result can mean many things. Most of these are not cancer. The results of your test may be abnormal because: 
¨ You have an infection of the vagina or cervix, such as a yeast infection. ¨ You have an IUD (intrauterine device for birth control). ¨ You have low estrogen levels after menopause that are causing the cells to change. ¨ You have cell changes that may be a sign of precancer or cancer. The results are ranked based on how serious the changes might be. There are many other reasons why you might not get a normal result.  If the results were abnormal, you may need to get another test within a few weeks or months. If the results show changes that could be a sign of cancer, you may need a test called a colposcopy, which provides a more complete view of the cervix. Sometimes the lab cannot use the sample because it does not contain enough cells or was not preserved well. If so, you may need to have the test again. This is not common, but it does happen from time to time. When should you call for help? Watch closely for changes in your health, and be sure to contact your doctor if: 
  · You have vaginal bleeding or pain for more than 2 days after the test. It is normal to have a small amount of bleeding for a day or two after the test.  
Where can you learn more? Go to http://yifan-ryley.info/. Enter S994 in the search box to learn more about \"Pap Test: Care Instructions. \" Current as of: May 12, 2017 Content Version: 11.7 © 9579-3622 [employer]. Care instructions adapted under license by [employer] (which disclaims liability or warranty for this information). If you have questions about a medical condition or this instruction, always ask your healthcare professional. [address] any warranty or liability for your use of this information. Introducing [facility] & HEALTH SERVICES! Dear Sergio Arroyo: Thank you for requesting a [employer] account. Our records indicate that you already have an active Altruja account. You can access your account anytime at [email or web address] Did you know that you can access your hospital and ER discharge instructions at any time in Altruja? You can also review all of your test results from your hospital stay or ER visit. Additional Information If you have questions, please visit the Frequently Asked Questions section of the Alt[employer]ja website at [email or web address] Remember, MyChart is NOT to be used for urgent needs. For medical emergencies, dial 911. Now available from your iPhone and Android! Please provide this summary of care documentation to your next provider. Your primary care clinician is listed as [facility]. If you have any questions after today's visit, please call [phone].

## 2018-08-09 ENCOUNTER — TELEPHONE (OUTPATIENT)
Dept: FAMILY MEDICINE CLINIC | Age: 53
End: 2018-08-09

## 2018-08-09 LAB
ALBUMIN SERPL-MCNC: 3.7 G/DL (ref 3.5–5.5)
ALBUMIN/GLOB SERPL: 1 {RATIO} (ref 1.2–2.2)
ALP SERPL-CCNC: 103 IU/L (ref 39–117)
ALT SERPL-CCNC: 22 IU/L (ref 0–32)
AST SERPL-CCNC: 22 IU/L (ref 0–40)
BASOPHILS # BLD AUTO: 0 X10E3/UL (ref 0–0.2)
BASOPHILS NFR BLD AUTO: 0 %
BILIRUB SERPL-MCNC: 0.3 MG/DL (ref 0–1.2)
BUN SERPL-MCNC: 8 MG/DL (ref 6–24)
BUN/CREAT SERPL: 10 (ref 9–23)
CALCIUM SERPL-MCNC: 8.8 MG/DL (ref 8.7–10.2)
CHLORIDE SERPL-SCNC: 100 MMOL/L (ref 96–106)
CHOLEST SERPL-MCNC: 193 MG/DL (ref 100–199)
CO2 SERPL-SCNC: 24 MMOL/L (ref 20–29)
CREAT SERPL-MCNC: 0.84 MG/DL (ref 0.57–1)
CYTOLOGIST CVX/VAG CYTO: NORMAL
CYTOLOGY CVX/VAG DOC THIN PREP: NORMAL
DX ICD CODE: NORMAL
EOSINOPHIL # BLD AUTO: 0.2 X10E3/UL (ref 0–0.4)
EOSINOPHIL NFR BLD AUTO: 2 %
ERYTHROCYTE [DISTWIDTH] IN BLOOD BY AUTOMATED COUNT: 15.2 % (ref 12.3–15.4)
GLOBULIN SER CALC-MCNC: 3.6 G/DL (ref 1.5–4.5)
GLUCOSE SERPL-MCNC: 87 MG/DL (ref 65–99)
HCT VFR BLD AUTO: 41 % (ref 34–46.6)
HDLC SERPL-MCNC: 72 MG/DL
HGB BLD-MCNC: 13.1 G/DL (ref 11.1–15.9)
IMM GRANULOCYTES # BLD: 0 X10E3/UL (ref 0–0.1)
IMM GRANULOCYTES NFR BLD: 0 %
INTERPRETATION, 910389: NORMAL
LABCORP, 190119: NORMAL
LDLC SERPL CALC-MCNC: 99 MG/DL (ref 0–99)
LYMPHOCYTES # BLD AUTO: 1.4 X10E3/UL (ref 0.7–3.1)
LYMPHOCYTES NFR BLD AUTO: 19 %
Lab: NORMAL
MCH RBC QN AUTO: 24.1 PG (ref 26.6–33)
MCHC RBC AUTO-ENTMCNC: 32 G/DL (ref 31.5–35.7)
MCV RBC AUTO: 75 FL (ref 79–97)
MONOCYTES # BLD AUTO: 0.6 X10E3/UL (ref 0.1–0.9)
MONOCYTES NFR BLD AUTO: 8 %
NEUTROPHILS # BLD AUTO: 5.2 X10E3/UL (ref 1.4–7)
NEUTROPHILS NFR BLD AUTO: 71 %
OTHER STN SPEC: NORMAL
PATH REPORT.FINAL DX SPEC: NORMAL
PLATELET # BLD AUTO: 215 X10E3/UL (ref 150–379)
POTASSIUM SERPL-SCNC: 3.7 MMOL/L (ref 3.5–5.2)
PROT SERPL-MCNC: 7.3 G/DL (ref 6–8.5)
RBC # BLD AUTO: 5.44 X10E6/UL (ref 3.77–5.28)
SODIUM SERPL-SCNC: 138 MMOL/L (ref 134–144)
STAT OF ADQ CVX/VAG CYTO-IMP: NORMAL
TRIGL SERPL-MCNC: 109 MG/DL (ref 0–149)
VLDLC SERPL CALC-MCNC: 22 MG/DL (ref 5–40)
WBC # BLD AUTO: 7.4 X10E3/UL (ref 3.4–10.8)

## 2018-08-09 NOTE — TELEPHONE ENCOUNTER
----- Message from Ching Ratliff MD sent at 8/9/2018  8:25 AM EDT -----  Labs all ok, RBC are slightly small (Stable) d/t sickle trait but no anemia.  Cholesterol and electrolytes ok

## 2018-08-10 NOTE — TELEPHONE ENCOUNTER
Left message to call office regarding results, now also available for review on Mychart.  [facility]

## 2018-08-10 NOTE — TELEPHONE ENCOUNTER
----- Message from [facility] sent at 8/10/2018  4:17 PM EDT -----  Regarding: Dr. Arvind Rock returned a call from [place] in reference to test results.          Best contact: [phone]

## 2018-08-21 ENCOUNTER — OFFICE VISIT (OUTPATIENT)
Dept: FAMILY MEDICINE CLINIC | Age: 53
End: 2018-08-21

## 2018-08-21 VITALS
TEMPERATURE: 98 F | HEIGHT: 65 IN | SYSTOLIC BLOOD PRESSURE: 136 MMHG | BODY MASS INDEX: 28.49 KG/M2 | DIASTOLIC BLOOD PRESSURE: 82 MMHG | HEART RATE: 67 BPM | RESPIRATION RATE: 18 BRPM | WEIGHT: 171 LBS

## 2018-08-21 DIAGNOSIS — J01.10 ACUTE NON-RECURRENT FRONTAL SINUSITIS: Primary | ICD-10-CM

## 2018-08-21 DIAGNOSIS — I10 BENIGN ESSENTIAL HTN: ICD-10-CM

## 2018-08-21 RX ORDER — AZITHROMYCIN 500 MG/1
500 TABLET, FILM COATED ORAL DAILY
Qty: 3 TAB | Refills: 0 | Status: SHIPPED | OUTPATIENT
Start: 2018-08-21 | End: 2018-08-24

## 2018-08-21 NOTE — MR AVS SNAPSHOT
[address] 
[phone] Patient: Edu Paul MRN: [id number] :[id number] Visit Information Date & Time Provider Department Dept. Phone Encounter #  
 2018 11:00 AM Vivian ChoElenita 34 [id number] Your Appointments 2019  8:00 AM  
ROUTINE CARE with Vivian Cho MD  
[address] [place] [address]) Appt Note: 6 month f/u BP  
 [address]  
[phone]  
  
   
 [address] [place] [address]. [address] [address] Upcoming Health Maintenance Date Due Pneumococcal 19-64 Medium Risk (1 of 1 - PPSV23) 1985 DTaP/Tdap/Td series (1 - Tdap) 1987 Influenza Age 5 to Adult 2018 BREAST CANCER SCRN MAMMOGRAM 11/3/2019 PAP AKA CERVICAL CYTOLOGY 2021 COLONOSCOPY 2022 Allergies as of 2018  Review Complete On: 2018 By: Vivian Cho MD  
  
 Severity Noted Reaction Type Reactions Latex  2012    Rash Papaya  2012    Swelling Swelling of lips. Sulfa (Sulfonamide Antibiotics)  2012    Swelling Current Immunizations  Never Reviewed No immunizations on file. Not reviewed this visit You Were Diagnosed With   
  
 Codes Comments Acute non-recurrent frontal sinusitis    -  Primary ICD-10-CM: J01.10 ICD-9-CM: 824.9 Vitals BP Pulse Temp Resp Height(growth percentile) Weight(growth percentile) 136/82 67 98 °F (36.7 °C) (Oral) 18 5' 5\" (1.651 m) 171 lb (77.6 kg) LMP BMI OB Status Smoking Status 2018 28.46 kg/m2 Having regular periods Never Smoker Vitals History BMI and BSA Data Body Mass Index Body Surface Area  
 28.46 kg/m 2 1.89 m 2 Preferred Pharmacy Pharm[facility] Name Phone [id number] [place] [place] [facility] [address] [facility] AT [address] [place] [address] [place] [phone] Your Updated Medication List  
  
   
This list is accurate as of 8/21/18 11:53 AM.  Always use your most recent med list.  
  
  
  
  
 albuterol 90 mcg/actuation inhaler Commonly known as:  PROAIR HFA Take 2 Puffs by inhalation every four (4) hours as needed for Wheezing. azithromycin 500 mg Tab Commonly known as:  Gisela Malcom Take 1 Tab by mouth daily for 3 days. CORICIDIN HBP COUGH AND COLD 4-30 mg Tab Generic drug:  chlorpheniramine-dm Take  by mouth. DAILY GUMMIES PO Take  by mouth daily. fluticasone 50 mcg/actuation nasal spray Commonly known as:  Lluvia Shames 2 Sprays by Both Nostrils route daily. hydroCHLOROthiazide 25 mg tablet Commonly known as:  HYDRODIURIL Take 1 Tab by mouth daily. norethindrone-ethinyl estradiol 1-35 mg-mcg Tab Commonly known as:  ALYACEN 1/35 (28) Take 1 Tab by mouth daily. PROBIOTIC & ACIDOPHILUS PO Take  by mouth. Prescriptions Sent to Pharmacy Refills  
 azithromycin (ZITHROMAX) 500 mg tab 0 Sig: Take 1 Tab by mouth daily for 3 days. Class: Normal  
 Pharmacy: [facility] #[id number] AT [address] [place] [address] #: [phone] Route: Oral  
  
Introducing [facility] & HEALTH SERVICES! Dear Alek Dubon: Thank you for requesting a Volumental account. Our records indicate that you already have an active Volumental account. You can access your account anytime at [email or web address] Did you know that you can access your hospital and ER discharge instructions at any time in Volumental? You can also review all of your test results from your hospital stay or ER visit. Additional Information If you have questions, please visit the Frequently Asked Questions section of the Volumental website at [email or web address] Remember, Volumental is NOT to be used for urgent needs. For medical emergencies, dial 911. Now available from your iPhone and Android! Please provide this summary of care documentation to your next provider. Your primary care clinician is listed as Ashley Pemberton. If you have any questions after today's visit, please call [phone].

## 2018-08-21 NOTE — PROGRESS NOTES
HISTORY OF PRESENT ILLNESS  Richardson Hunter is a 47 y.o. female. HPI Comments: Richardson Hunter presents with nasal congestion for the past 14 days and headache for the past 3 days. \"The last time I had this I had a sinus infection. \"  Also, her left forehead is sore. Her symptoms are getting worse. She has tried OTC with mild relief. Review of Systems   Constitutional: Negative for chills, fever and malaise/fatigue. HENT: Positive for congestion. Negative for ear pain and sore throat. Mucous is clear to green in color. There is sinus pain. Eyes: Negative for discharge and redness. Respiratory: Negative for cough, sputum production, shortness of breath and wheezing. Cardiovascular: Negative for chest pain. Neurological: Positive for headaches. Visit Vitals    /82    Pulse 67    Temp 98 °F (36.7 °C) (Oral)    Resp 18    Ht 5' 5\" (1.651 m)    Wt 171 lb (77.6 kg)    LMP 07/30/2018    BMI 28.46 kg/m2     Physical Exam   Constitutional: She is oriented to person, place, and time. She appears well-developed and well-nourished. No distress. HENT:   Head: Normocephalic. Right Ear: Tympanic membrane, external ear and ear canal normal.   Left Ear: Tympanic membrane, external ear and ear canal normal.   Nose: Right sinus exhibits frontal sinus tenderness. Right sinus exhibits no maxillary sinus tenderness. Left sinus exhibits no maxillary sinus tenderness and no frontal sinus tenderness. Mouth/Throat: Uvula is midline, oropharynx is clear and moist and mucous membranes are normal.   Mild bleeding in the right nostril   Eyes: Right eye exhibits no discharge. Left eye exhibits no discharge. Right conjunctiva is not injected. Left conjunctiva is not injected. Cardiovascular: Normal rate, regular rhythm and normal heart sounds. Exam reveals no gallop and no friction rub. No murmur heard. Pulmonary/Chest: Effort normal and breath sounds normal. No respiratory distress.  She has no wheezes. She has no rales. Lymphadenopathy:     She has no cervical adenopathy. Neurological: She is alert and oriented to person, place, and time. Skin: Skin is warm and dry. She is not diaphoretic. Nursing note and vitals reviewed. ASSESSMENT and PLAN    ICD-10-CM ICD-9-CM    1. Acute non-recurrent frontal sinusitis J01.10 461.1 azithromycin (ZITHROMAX) 500 mg tab      chlorpheniramine-dm (CORICIDIN HBP COUGH AND COLD) 4-30 mg tab   2. Benign essential HTN I10 401.1         Rest, push fluids  Zithromax  Coricidin HBP prn  No decongestants due to HTN    Follow-up Disposition:  Return if not better in 5 days. Reviewed plan of care. Patient has provided input and agrees with goals.

## 2018-08-21 NOTE — PROGRESS NOTES
Chief Complaint   Patient presents with    Headache     and nasal drainage; x 2 days     1. Have you been to the ER, urgent care clinic since your last visit? No Hospitalized since your last visit? No     2. Have you seen or consulted any other health care providers outside of the [facility] since your last visit? Include any pap smears or colon screening. No     Pt declines due vaccines at this time.

## 2018-11-05 ENCOUNTER — HOSPITAL ENCOUNTER (OUTPATIENT)
Dept: MAMMOGRAPHY | Age: 53
Discharge: HOME | End: 2018-11-05
Attending: FAMILY MEDICINE
Payer: COMMERCIAL

## 2018-11-05 DIAGNOSIS — Z12.39 SCREENING FOR MALIGNANT NEOPLASM OF BREAST: ICD-10-CM

## 2018-11-05 PROCEDURE — 77067 SCR MAMMO BI INCL CAD: CPT

## 2019-02-05 NOTE — PROGRESS NOTES
HISTORY OF PRESENT ILLNESS  Ale Moctezuma is a 47 y.o. female. Hypertension   The history is provided by the patient. This is a chronic problem. The current episode started more than 1 week ago. The problem occurs constantly. The problem has not changed since onset. Pertinent negatives include no chest pain, no abdominal pain, no headaches and no shortness of breath. Nothing aggravates the symptoms. The symptoms are relieved by medications. Treatments tried: HCTZ. The treatment provided significant relief. Review of Systems   Constitutional: Negative for weight loss. No weight gain   Eyes: Negative for blurred vision. Respiratory: Negative for shortness of breath. Cardiovascular: Negative for chest pain and leg swelling. Gastrointestinal: Negative for abdominal pain. Neurological: Negative for dizziness, sensory change, speech change, focal weakness and headaches. Visit Vitals  /81   Pulse 68   Temp 98.2 °F (36.8 °C) (Oral)   Resp 18   Ht 5' 5\" (1.651 m)   Wt 173 lb (78.5 kg)   BMI 28.79 kg/m²     BP Readings from Last 3 Encounters:   08/21/18 136/82   08/08/18 137/86   04/24/18 129/87     Physical Exam   Constitutional: She is oriented to person, place, and time. She appears well-developed and well-nourished. No distress. Cardiovascular: Normal rate, regular rhythm and normal heart sounds. Exam reveals no gallop and no friction rub. No murmur heard. Pulmonary/Chest: Effort normal and breath sounds normal. No respiratory distress. She has no wheezes. She has no rales. Musculoskeletal: She exhibits no edema. Neurological: She is alert and oriented to person, place, and time. Skin: Skin is warm and dry. She is not diaphoretic. Nursing note and vitals reviewed. ASSESSMENT and PLAN    ICD-10-CM ICD-9-CM    1. Benign essential HTN I86 952.3 METABOLIC PANEL, BASIC   2.  Asthma J45.909 493.90 albuterol (PROAIR HFA) 90 mcg/actuation inhaler        Blood pressure controlled  Continue current plans. Labs per orders. Refills per orders    Follow-up Disposition:  Return in about 6 months (around 8/6/2019) for blood pressure. Reviewed plan of care. Patient has provided input and agrees with goals.

## 2019-02-06 ENCOUNTER — OFFICE VISIT (OUTPATIENT)
Dept: FAMILY MEDICINE CLINIC | Age: 54
End: 2019-02-06

## 2019-02-06 VITALS
SYSTOLIC BLOOD PRESSURE: 135 MMHG | HEIGHT: 65 IN | WEIGHT: 173 LBS | BODY MASS INDEX: 28.82 KG/M2 | HEART RATE: 68 BPM | RESPIRATION RATE: 18 BRPM | DIASTOLIC BLOOD PRESSURE: 81 MMHG | TEMPERATURE: 98.2 F

## 2019-02-06 DIAGNOSIS — J45.909 ASTHMA: ICD-10-CM

## 2019-02-06 DIAGNOSIS — I10 BENIGN ESSENTIAL HTN: Primary | ICD-10-CM

## 2019-02-06 RX ORDER — ALBUTEROL SULFATE 90 UG/1
2 AEROSOL, METERED RESPIRATORY (INHALATION)
Qty: 1 INHALER | Refills: 3 | Status: SHIPPED | OUTPATIENT
Start: 2019-02-06 | End: 2021-09-27 | Stop reason: SDUPTHER

## 2019-02-06 NOTE — PROGRESS NOTES
Chief Complaint   Patient presents with    Blood Pressure Check     6 mo f/u     1. Have you been to the ER, urgent care clinic since your last visit? Hospitalized since your last visit? No     2. Have you seen or consulted any other health care providers outside of the [address] [place] [address] since your last visit? Include any pap smears or colon screening. No     Pt declines vaccines.

## 2019-02-07 LAB
BUN SERPL-MCNC: 9 MG/DL (ref 6–24)
BUN/CREAT SERPL: 10 (ref 9–23)
CALCIUM SERPL-MCNC: 9.1 MG/DL (ref 8.7–10.2)
CHLORIDE SERPL-SCNC: 99 MMOL/L (ref 96–106)
CO2 SERPL-SCNC: 26 MMOL/L (ref 20–29)
CREAT SERPL-MCNC: 0.88 MG/DL (ref 0.57–1)
GLUCOSE SERPL-MCNC: 88 MG/DL (ref 65–99)
POTASSIUM SERPL-SCNC: 3.3 MMOL/L (ref 3.5–5.2)
SODIUM SERPL-SCNC: 139 MMOL/L (ref 134–144)

## 2019-02-17 DIAGNOSIS — E87.6 HYPOKALEMIA: Primary | ICD-10-CM

## 2019-02-20 RX ORDER — ZINC GLUCONATE 10 MG
1 LOZENGE ORAL DAILY
Qty: 90 TAB | Refills: 3 | COMMUNITY
Start: 2019-02-20 | End: 2019-08-07

## 2019-02-21 DIAGNOSIS — I10 BENIGN ESSENTIAL HTN: ICD-10-CM

## 2019-02-24 LAB — POTASSIUM SERPL-SCNC: 3.3 MMOL/L (ref 3.5–5.2)

## 2019-02-26 RX ORDER — HYDROCHLOROTHIAZIDE 25 MG/1
25 TABLET ORAL DAILY
Qty: 90 TAB | Refills: 3 | Status: SHIPPED | OUTPATIENT
Start: 2019-02-26 | End: 2020-02-24

## 2019-03-03 DIAGNOSIS — E87.6 HYPOKALEMIA: Primary | ICD-10-CM

## 2019-03-03 RX ORDER — POTASSIUM CHLORIDE 20 MEQ/1
20 TABLET, EXTENDED RELEASE ORAL DAILY
Qty: 30 TAB | Refills: 1 | Status: SHIPPED | OUTPATIENT
Start: 2019-03-03 | End: 2019-04-04 | Stop reason: SDUPTHER

## 2019-03-26 LAB
MAGNESIUM SERPL-MCNC: 2 MG/DL (ref 1.6–2.3)
POTASSIUM SERPL-SCNC: 3.4 MMOL/L (ref 3.5–5.2)

## 2019-04-04 DIAGNOSIS — E87.6 HYPOKALEMIA: ICD-10-CM

## 2019-04-05 RX ORDER — POTASSIUM CHLORIDE 20 MEQ/1
20 TABLET, EXTENDED RELEASE ORAL DAILY
Qty: 90 TAB | Refills: 3 | Status: SHIPPED | OUTPATIENT
Start: 2019-04-05 | End: 2019-04-07 | Stop reason: DRUGHIGH

## 2019-04-07 DIAGNOSIS — E87.6 HYPOKALEMIA: ICD-10-CM

## 2019-04-07 RX ORDER — POTASSIUM CHLORIDE 20 MEQ/1
40 TABLET, EXTENDED RELEASE ORAL DAILY
Qty: 60 TAB | Refills: 1 | Status: SHIPPED | OUTPATIENT
Start: 2019-04-07 | End: 2020-03-29

## 2019-05-07 DIAGNOSIS — E87.6 HYPOKALEMIA: ICD-10-CM

## 2019-05-16 LAB — POTASSIUM SERPL-SCNC: 3.8 MMOL/L (ref 3.5–5.2)

## 2019-08-07 ENCOUNTER — OFFICE VISIT (OUTPATIENT)
Dept: FAMILY MEDICINE CLINIC | Age: 54
End: 2019-08-07

## 2019-08-07 VITALS
HEART RATE: 67 BPM | BODY MASS INDEX: 28.66 KG/M2 | DIASTOLIC BLOOD PRESSURE: 72 MMHG | TEMPERATURE: 97 F | HEIGHT: 65 IN | SYSTOLIC BLOOD PRESSURE: 136 MMHG | RESPIRATION RATE: 18 BRPM | WEIGHT: 172 LBS

## 2019-08-07 DIAGNOSIS — I10 BENIGN ESSENTIAL HTN: Primary | ICD-10-CM

## 2019-08-07 DIAGNOSIS — D50.9 IRON DEFICIENCY ANEMIA, UNSPECIFIED IRON DEFICIENCY ANEMIA TYPE: ICD-10-CM

## 2019-08-07 DIAGNOSIS — E78.00 HYPERCHOLESTEREMIA: ICD-10-CM

## 2019-08-07 NOTE — PROGRESS NOTES
HISTORY OF PRESENT ILLNESS  Sarah Arora is a 47 y.o. female. Sarah Arora is here to follow up on their HTN. This is a chronic problem. The current episode started more than 1 week ago. The problem occurs constantly and is slightly higher. Pertinent negatives include no chest pain, no abdominal pain, no headaches and no shortness of breath. Nothing aggravates the symptoms. The symptoms are relieved by hydrochlorothiazide, which is working moderately to significantly. She also needs follow up on her anemia and elevated lipids, however, she just gave blood 7/31. Review of Systems   Constitutional: Negative for weight loss. No weight gain   Eyes: Negative for blurred vision. Respiratory: Negative for shortness of breath. Cardiovascular: Negative for chest pain and leg swelling. Gastrointestinal: Negative for abdominal pain. Neurological: Negative for dizziness, sensory change, speech change, focal weakness and headaches. Visit Vitals  /72 Comment: left arm, manual cuff   Pulse 67   Temp 97 °F (36.1 °C) (Oral)   Resp 18   Ht 5' 5\" (1.651 m)   Wt 172 lb (78 kg)   BMI 28.62 kg/m²     BP Readings from Last 3 Encounters:   02/06/19 135/81   08/21/18 136/82   08/08/18 137/86     Physical Exam   Constitutional: She is oriented to person, place, and time. She appears well-developed and well-nourished. No distress. Cardiovascular: Normal rate, regular rhythm and normal heart sounds. Exam reveals no gallop and no friction rub. No murmur heard. Pulmonary/Chest: Effort normal and breath sounds normal. No respiratory distress. She has no wheezes. She has no rales. Musculoskeletal: She exhibits no edema. Neurological: She is alert and oriented to person, place, and time. Skin: Skin is warm and dry. She is not diaphoretic. Nursing note and vitals reviewed. ASSESSMENT and PLAN    ICD-10-CM ICD-9-CM    1. Benign essential HTN K57 799.9 METABOLIC PANEL, BASIC   2. Hypercholesteremia E78.00 272.0 LIPID PANEL      HEPATIC FUNCTION PANEL   3. Iron deficiency anemia, unspecified iron deficiency anemia type D50.9 280.9         Blood pressure controlled  Needs lipid testing  Anemia, but just donated blood  Labs per orders. Continue current plans. Will check CBC at follow up visit    Follow-up and Dispositions    · Return in about 3 weeks (around 8/28/2019) for physical.           Reviewed plan of care. Patient has provided input and agrees with goals.

## 2019-08-07 NOTE — PROGRESS NOTES
Chief Complaint   Patient presents with    Hypertension     follow up     Arm Pain     both arms that goes towards back

## 2019-08-08 LAB
ALBUMIN SERPL-MCNC: 3.8 G/DL (ref 3.5–5.5)
ALP SERPL-CCNC: 97 IU/L (ref 39–117)
ALT SERPL-CCNC: 15 IU/L (ref 0–32)
AST SERPL-CCNC: 17 IU/L (ref 0–40)
BILIRUB DIRECT SERPL-MCNC: 0.07 MG/DL (ref 0–0.4)
BILIRUB SERPL-MCNC: <0.2 MG/DL (ref 0–1.2)
BUN SERPL-MCNC: 9 MG/DL (ref 6–24)
BUN/CREAT SERPL: 12 (ref 9–23)
CALCIUM SERPL-MCNC: 9 MG/DL (ref 8.7–10.2)
CHLORIDE SERPL-SCNC: 100 MMOL/L (ref 96–106)
CHOLEST SERPL-MCNC: 180 MG/DL (ref 100–199)
CO2 SERPL-SCNC: 25 MMOL/L (ref 20–29)
CREAT SERPL-MCNC: 0.73 MG/DL (ref 0.57–1)
GLUCOSE SERPL-MCNC: 82 MG/DL (ref 65–99)
HDLC SERPL-MCNC: 69 MG/DL
INTERPRETATION, 910389: NORMAL
LDLC SERPL CALC-MCNC: 92 MG/DL (ref 0–99)
POTASSIUM SERPL-SCNC: 3.6 MMOL/L (ref 3.5–5.2)
PROT SERPL-MCNC: 7.1 G/DL (ref 6–8.5)
SODIUM SERPL-SCNC: 139 MMOL/L (ref 134–144)
TRIGL SERPL-MCNC: 97 MG/DL (ref 0–149)
VLDLC SERPL CALC-MCNC: 19 MG/DL (ref 5–40)

## 2019-08-15 NOTE — TELEPHONE ENCOUNTER
[facility] pharmacist called to check on status of request for refill sent 8/12/19.  Blas

## 2019-08-16 RX ORDER — NORETHINDRONE AND ETHINYL ESTRADIOL 1 MG-35MCG
KIT ORAL
Qty: 84 TAB | Refills: 1 | Status: SHIPPED | OUTPATIENT
Start: 2019-08-16 | End: 2019-10-28

## 2019-10-28 ENCOUNTER — OFFICE VISIT (OUTPATIENT)
Dept: FAMILY MEDICINE CLINIC | Age: 54
End: 2019-10-28

## 2019-10-28 VITALS
HEIGHT: 65 IN | SYSTOLIC BLOOD PRESSURE: 138 MMHG | WEIGHT: 172 LBS | TEMPERATURE: 97.1 F | HEART RATE: 64 BPM | BODY MASS INDEX: 28.66 KG/M2 | DIASTOLIC BLOOD PRESSURE: 88 MMHG

## 2019-10-28 DIAGNOSIS — Z12.11 SPECIAL SCREENING FOR MALIGNANT NEOPLASMS, COLON: ICD-10-CM

## 2019-10-28 DIAGNOSIS — D57.3 SICKLE CELL TRAIT (HCC): ICD-10-CM

## 2019-10-28 DIAGNOSIS — Z01.419 ENCOUNTER FOR GYNECOLOGICAL EXAMINATION WITHOUT ABNORMAL FINDING: Primary | ICD-10-CM

## 2019-10-28 DIAGNOSIS — I10 BENIGN ESSENTIAL HTN: ICD-10-CM

## 2019-10-28 NOTE — PROGRESS NOTES
Subjective:  Heather Alvarado is a 49 y.o. female here for annual physical exam.       Health Habits. Lifestyle:  Occupation:   for a [employer] members:  3, patient, spouse, son  Last dental appointment:   Last month  Last eye exam:  8/2018  Uses seatbelts regularly :  yes  Getting regular exercise:  yes  Last colonoscopy:   7/2017  Last mammogram:  11/5/18       Patient Active Problem List   Diagnosis Code    Hypercholesteremia E78.00    Asthma J45.909    Sickle cell trait ([facility] [address] D57.3    Seasonal allergic rhinitis J30.2    Iron deficiency anemia D50.9    Benign essential HTN I10    Overweight E66.3     Past Medical History:   Diagnosis Date    Allergic rhinitis, cause unspecified 11/28/2012    Asthma 9/5/2012    Benign essential HTN 6/10/2015    Hypercholesteremia 9/5/2012    Iron deficiency anemia 11/28/2012    Overweight 2/15/2016    Sickle cell trait ([facility] [address] 11/28/2012     Past Surgical History:   Procedure Laterality Date    HX GYN      Cervical cryo      Family History   Problem Relation Age of Onset    Heart Disease Mother 79        CABG    Hypertension Mother     Asthma Brother     Cancer Maternal Grandmother         Ovarian     Social History     Tobacco Use    Smoking status: Never Smoker    Smokeless tobacco: Never Used   Substance Use Topics    Alcohol use: No     Allergies   Allergen Reactions    Latex Rash    Papaya Swelling     Swelling of lips.  Sulfa (Sulfonamide Antibiotics) Swelling     Current Outpatient Medications   Medication Sig Dispense Refill    ALYACEN 1/35, 28, 1-35 mg-mcg tab TAKE 1 TABLET BY MOUTH EVERY DAY 84 Tab 1    potassium chloride (K-DUR, KLOR-CON) 20 mEq tablet Take 2 Tabs by mouth daily. 60 Tab 1    hydroCHLOROthiazide (HYDRODIURIL) 25 mg tablet Take 1 Tab by mouth daily. 90 Tab 3    albuterol (PROAIR HFA) 90 mcg/actuation inhaler Take 2 Puffs by inhalation every four (4) hours as needed for Wheezing.  1 Inhaler 3    LACTOBAC CMB #3/FOS/PANTETHINE (PROBIOTIC & ACIDOPHILUS PO) Take  by mouth.  fluticasone (FLONASE) 50 mcg/actuation nasal spray 2 Sprays by Both Nostrils route daily. 1 Bottle 11        Review of Systems  A comprehensive review of systems was negative.     Objective:  Visit Vitals  /88   Pulse 64   Temp 97.1 °F (36.2 °C) (Oral)   Ht 5' 5\" (1.651 m)   Wt 172 lb (78 kg)   LMP 10/21/2019   BMI 28.62 kg/m²     Physical Examination:   General appearance - alert, well appearing, and in no distress  Mental status - alert, oriented to person, place, and time, normal mood, behavior, speech, dress, motor activity, and thought processes  Eyes - pupils equal and reactive, extraocular eye movements intact, sclera anicteric  Ears - bilateral TM's and external ear canals normal  Nose - normal and patent, no erythema, discharge or polyps  Mouth - mucous membranes moist, pharynx normal without lesions and dental hygiene good  Neck - supple, no significant adenopathy, carotids upstroke normal bilaterally, no bruits, thyroid exam: thyroid is normal in size without nodules or tenderness  Lymphatics - no palpable lymphadenopathy, no hepatosplenomegaly  Chest - clear to auscultation, no wheezes, rales or rhonchi, symmetric air entry  Heart - normal rate, regular rhythm, normal S1, S2, no murmurs, rubs, clicks or gallops  Abdomen - soft, nontender, nondistended, no masses or organomegaly  bowel sounds normal  Breasts - breasts appear normal, no suspicious masses, no skin or nipple changes or axillary nodes  Pelvic - normal external genitalia, vulva, vagina, cervix, uterus and adnexa  Rectal - normal rectal, no masses  Neurological - alert, oriented, normal speech, no focal findings or movement disorder noted  Musculoskeletal - normal gait  Extremities - peripheral pulses normal, no pedal edema, no clubbing or cyanosis  Skin - normal coloration and turgor, no rashes, no suspicious skin lesions noted     Assessment/Plan: ICD-10-CM ICD-9-CM    1. Encounter for gynecological examination without abnormal finding N35.353 M17.55 METABOLIC PANEL, COMPREHENSIVE      PAP IG, HPV AND RFX HPV 71/22,60([id number])      KEVIN 3D GUILLERMO W MAMMO BI SCREENING INCL CAD   2. Benign essential HTN Q79 027.8 METABOLIC PANEL, COMPREHENSIVE   3. Sickle cell trait (HCC) D57.3 282.5 CBC WITH AUTOMATED DIFF   4. Special screening for malignant neoplasms, colon Z12.11 V76.51 OCCULT BLOOD IMMUNOASSAY,DIAGNOSTIC         Breast awareness  Labs per orders. Mammogram   Stop OCPs    Follow-up and Dispositions    · Return in about 6 months (around 4/28/2020) for blood pressure. Reviewed plan of care. Patient has provided input and agrees with goals.

## 2019-10-28 NOTE — PROGRESS NOTES
Chief Complaint   Patient presents with    Well Woman     pt would like PAP    Labs     fasting labs

## 2019-10-29 LAB
ALBUMIN SERPL-MCNC: 4 G/DL (ref 3.5–5.5)
ALBUMIN/GLOB SERPL: 1.2 {RATIO} (ref 1.2–2.2)
ALP SERPL-CCNC: 108 IU/L (ref 39–117)
ALT SERPL-CCNC: 19 IU/L (ref 0–32)
AST SERPL-CCNC: 18 IU/L (ref 0–40)
BASOPHILS # BLD AUTO: 0.1 X10E3/UL (ref 0–0.2)
BASOPHILS NFR BLD AUTO: 1 %
BILIRUB SERPL-MCNC: 0.3 MG/DL (ref 0–1.2)
BUN SERPL-MCNC: 14 MG/DL (ref 6–24)
BUN/CREAT SERPL: 16 (ref 9–23)
CALCIUM SERPL-MCNC: 9.3 MG/DL (ref 8.7–10.2)
CHLORIDE SERPL-SCNC: 101 MMOL/L (ref 96–106)
CO2 SERPL-SCNC: 26 MMOL/L (ref 20–29)
CREAT SERPL-MCNC: 0.87 MG/DL (ref 0.57–1)
EOSINOPHIL # BLD AUTO: 0.2 X10E3/UL (ref 0–0.4)
EOSINOPHIL NFR BLD AUTO: 3 %
ERYTHROCYTE [DISTWIDTH] IN BLOOD BY AUTOMATED COUNT: 15.6 % (ref 12.3–15.4)
GLOBULIN SER CALC-MCNC: 3.3 G/DL (ref 1.5–4.5)
GLUCOSE SERPL-MCNC: 87 MG/DL (ref 65–99)
HCT VFR BLD AUTO: 40.8 % (ref 34–46.6)
HGB BLD-MCNC: 13.1 G/DL (ref 11.1–15.9)
IMM GRANULOCYTES # BLD AUTO: 0 X10E3/UL (ref 0–0.1)
IMM GRANULOCYTES NFR BLD AUTO: 0 %
LYMPHOCYTES # BLD AUTO: 1.6 X10E3/UL (ref 0.7–3.1)
LYMPHOCYTES NFR BLD AUTO: 27 %
MCH RBC QN AUTO: 23.4 PG (ref 26.6–33)
MCHC RBC AUTO-ENTMCNC: 32.1 G/DL (ref 31.5–35.7)
MCV RBC AUTO: 73 FL (ref 79–97)
MONOCYTES # BLD AUTO: 0.5 X10E3/UL (ref 0.1–0.9)
MONOCYTES NFR BLD AUTO: 8 %
NEUTROPHILS # BLD AUTO: 3.5 X10E3/UL (ref 1.4–7)
NEUTROPHILS NFR BLD AUTO: 61 %
PLATELET # BLD AUTO: 276 X10E3/UL (ref 150–450)
POTASSIUM SERPL-SCNC: 3.8 MMOL/L (ref 3.5–5.2)
PROT SERPL-MCNC: 7.3 G/DL (ref 6–8.5)
RBC # BLD AUTO: 5.61 X10E6/UL (ref 3.77–5.28)
SODIUM SERPL-SCNC: 140 MMOL/L (ref 134–144)
WBC # BLD AUTO: 5.9 X10E3/UL (ref 3.4–10.8)

## 2019-10-30 LAB — HEMOCCULT STL QL IA: NEGATIVE

## 2019-11-01 LAB
CYTOLOGIST CVX/VAG CYTO: NORMAL
CYTOLOGY CVX/VAG DOC CYTO: NORMAL
CYTOLOGY CVX/VAG DOC THIN PREP: NORMAL
DX ICD CODE: NORMAL
HPV I/H RISK 1 DNA CVX QL PROBE+SIG AMP: NEGATIVE
Lab: NORMAL
OTHER STN SPEC: NORMAL
STAT OF ADQ CVX/VAG CYTO-IMP: NORMAL

## 2019-11-15 ENCOUNTER — HOSPITAL ENCOUNTER (OUTPATIENT)
Dept: MAMMOGRAPHY | Age: 54
Discharge: HOME | End: 2019-11-15
Payer: COMMERCIAL

## 2019-11-15 DIAGNOSIS — Z01.419 ENCOUNTER FOR GYNECOLOGICAL EXAMINATION WITHOUT ABNORMAL FINDING: ICD-10-CM

## 2019-11-15 PROCEDURE — 77063 BREAST TOMOSYNTHESIS BI: CPT

## 2020-02-23 DIAGNOSIS — I10 BENIGN ESSENTIAL HTN: ICD-10-CM

## 2020-02-24 RX ORDER — HYDROCHLOROTHIAZIDE 25 MG/1
TABLET ORAL
Qty: 90 TAB | Refills: 3 | Status: SHIPPED | OUTPATIENT
Start: 2020-02-24 | End: 2021-02-15 | Stop reason: SDUPTHER

## 2020-03-28 DIAGNOSIS — E87.6 HYPOKALEMIA: ICD-10-CM

## 2020-03-29 RX ORDER — POTASSIUM CHLORIDE 1500 MG/1
TABLET, EXTENDED RELEASE ORAL
Qty: 90 TAB | Refills: 2 | Status: SHIPPED | OUTPATIENT
Start: 2020-03-29 | End: 2020-12-30 | Stop reason: SDUPTHER

## 2020-04-29 ENCOUNTER — VIRTUAL VISIT (OUTPATIENT)
Dept: FAMILY MEDICINE CLINIC | Age: 55
End: 2020-04-29

## 2020-04-29 VITALS
SYSTOLIC BLOOD PRESSURE: 131 MMHG | WEIGHT: 172 LBS | BODY MASS INDEX: 28.66 KG/M2 | DIASTOLIC BLOOD PRESSURE: 89 MMHG | HEIGHT: 65 IN

## 2020-04-29 DIAGNOSIS — I10 BENIGN ESSENTIAL HTN: ICD-10-CM

## 2020-04-29 DIAGNOSIS — N95.1 HOT FLASHES DUE TO MENOPAUSE: ICD-10-CM

## 2020-04-29 DIAGNOSIS — E78.00 HYPERCHOLESTEREMIA: ICD-10-CM

## 2020-04-29 DIAGNOSIS — I10 BENIGN ESSENTIAL HTN: Primary | ICD-10-CM

## 2020-04-29 DIAGNOSIS — Z78.0 MENOPAUSE: ICD-10-CM

## 2020-04-29 NOTE — PROGRESS NOTES
Tiana España is a 49 y.o. female who was seen by synchronous (real-time) audio-video technology on 4/29/2020. Assessment & Plan:   Diagnoses and all orders for this visit:    1. Benign essential HTN  -     METABOLIC PANEL, BASIC; Future    2. Hypercholesteremia  -     LIPID PANEL; Future  -     HEPATIC FUNCTION PANEL; Future    3. Menopause    4. Hot flashes due to menopause        Blood pressure controlled  Due for lipids soon  Not interested in medication for hot flashes  Labs per orders. Continue current plans. Will mail information on hot flashes to her    Follow-up and Dispositions    · Return in about 6 months (around 10/29/2020) for blood pressure. Reviewed plan of care. Patient has provided input and agrees with goals. CPT Codes 98318-59986 for Established Patients may apply to this Telehealth Visit      Subjective:   Tiana España was seen for Hypertension (follow up )      Tiana España is here to follow up on their HTN. This is a chronic problem. The problem occurs constantly and is a little  better. The symptoms are relieved by hydrochlorothiazide, which is/are working well. She has been having hot flashes at night. She is in menopause. Also, she is soon due for follow up on her elevated lipids. Prior to Admission medications    Medication Sig Start Date End Date Taking? Authorizing Provider   Klor-Con M20 20 mEq tablet TAKE 1 TABLET BY MOUTH EVERY DAY 3/29/20  Yes Karmen Gosselin, MD   hydroCHLOROthiazide (HYDRODIURIL) 25 mg tablet TAKE 1 TABLET BY MOUTH EVERY DAY 2/24/20  Yes Karmen Gosselin, MD   albuterol (PROAIR HFA) 90 mcg/actuation inhaler Take 2 Puffs by inhalation every four (4) hours as needed for Wheezing. 2/6/19  Yes Karmen Gosselin, MD   LACTOBAC CMB #3/FOS/PANTETHINE (PROBIOTIC & ACIDOPHILUS PO) Take  by mouth. Yes Provider, Historical   fluticasone (FLONASE) 50 mcg/actuation nasal spray 2 Sprays by Both Nostrils route daily.  3/5/14  Yes José Smith MD     Allergies   Allergen Reactions    Latex Rash    Papaya Swelling     Swelling of lips.  Sulfa (Sulfonamide Antibiotics) Swelling         Review of Systems   Constitutional: Negative for weight loss. No weight gain   Eyes: Negative for blurred vision. Respiratory: Negative for shortness of breath. Cardiovascular: Negative for chest pain and leg swelling. Gastrointestinal: Negative for abdominal pain. Neurological: Negative for dizziness, sensory change, speech change, focal weakness and headaches. Objective:     Visit Vitals  /89   Ht 5' 5\" (1.651 m)   Wt 172 lb (78 kg)   BMI 28.62 kg/m²     BP Readings from Last 3 Encounters:   04/29/20 131/89   10/28/19 138/88   08/07/19 136/72       Physical Exam  Constitutional:       General: She is not in acute distress. Appearance: Normal appearance. She is not diaphoretic. Neurological:      Mental Status: She is alert and oriented to person, place, and time. Due to this being a TeleHealth evaluation, many elements of the physical examination are unable to be assessed. We discussed the expected course, resolution and complications of the diagnosis(es) in detail. Medication risks, benefits, costs, interactions, and alternatives were discussed as indicated. I advised her to contact the office if her condition worsens, changes or fails to improve as anticipated. She expressed understanding with the diagnosis(es) and plan. Pursuant to the emergency declaration under the [facility], [facility] waiver authority and the [email or web address] Act, this Virtual  Visit was conducted, with patient's consent, to reduce the patient's risk of exposure to COVID-19 and provide continuity of care for an established patient.      Services were provided through a video synchronous discussion virtually to substitute for in-person clinic visit.     Minal Villalta MD

## 2020-04-29 NOTE — PATIENT INSTRUCTIONS
Learning About Menopause What is menopause? For most women, menopause is a natural process of aging. Menstrual periods gradually stop. The ability to become pregnant ends. Some women feel relief that they no longer have periods. But other women struggle with the physical and emotional changes that come with menopause. For most women, menopause happens around age 48. But every woman's body has its own timeline. Some women stop having periods in their mid-40s. Others keep having periods well into their 50s. And some women go through menopause early because of cancer treatment or surgery to remove the ovaries. What can you expect with menopause? · It starts with perimenopause. This is the process of change that leads up to menopause. Perimenopause can start as early as your late 35s or as late as your early 46s. How long it lasts varies. But it usually lasts from 2 to 8 years. · During this time, your hormone levels will go up and down unevenly (fluctuate). This causes changes in your periods and other symptoms. In time, estrogen and progesterone levels drop enough that the menstrual cycle stops. Going a full year without having a period is usually considered menopause. · Low estrogen levels after menopause speed bone loss. This increases your risk of osteoporosis. Also, your risk of heart disease increases after menopause. · It's normal to have thinner, drier skin after menopause. The vaginal lining and the lower urinary tract also thin. This can make it hard to have sex. It can also increase the risk of vaginal and urinary tract infections. What are the symptoms? · Hot flashes. You may have a sudden feeling of intense body heat. You may sweat, and your head, neck, and chest may get red. Along with hot flashes, you may have a heartbeat that's too fast or not regular. You may also feel anxious or grouchy. In rare cases you might feel panic. · Trouble sleeping. · Vaginal dryness. Symptoms related to mood and thinking may also happen around the time of menopause. These include: · Mood swings or feeling grouchy, depressed, or worried. · Problems with remembering or thinking clearly. Some women have only a few mild symptoms. Others have severe symptoms that disrupt their sleep and daily lives. Symptoms tend to last or get worse the first year or more after menopause. Over time, hormones even out at low levels. Many symptoms improve or go away. But some women may have symptoms that don't go away. How are menopause symptoms treated? 
 If your symptoms are bothering you, there are lifestyle changes and treatments that can help. 
 Lifestyle changes 
  · Choose a heart-healthy diet that is low in saturated fat. It should include plenty of fruits, vegetables, beans, and high-fiber grains and breads. Be sure you get enough calcium and vitamin D to help your bones stay strong. Low-fat or nonfat dairy products are a great source of calcium.  
  · Get regular exercise. Exercise can help you manage your weight, keep your heart and bones strong, and lift your mood.  
  · Limit caffeine, alcohol, and stress. These things may make symptoms worse. Limiting them may help you sleep better.  
  · If you smoke, stop. Quitting smoking can reduce hot flashes and long-term health risks. Medicines 
 If your symptoms bother you, talk with your doctor. You may want to try prescription medicines, such as: 
  · Birth control pills before menopause.  
  · Hormone therapy (HT).   · Antidepressants.  
  · A medicine called clonidine that is usually used to treat high blood pressure.  
 All medicines for menopause symptoms have possible risks or side effects. A very small number of women develop serious health problems when taking hormone therapy. Be sure to talk to your doctor about your possible health risks before you start a treatment for menopause symptoms. 
 Other treatments 
 You can try:   · Cognitive-behavorial therapy. This may help reduce hot flashes.  
  · Hypnosis. This may help reduce the number and severity of hot flashes.  
  · Breathing exercises. They may help reduce hot flashes and emotional symptoms.  
  · Soy. Some women feel that eating lots of soy helps even out their menopause symptoms.  
  · Yoga or biofeedback. They may help reduce stress. Follow-up care is a key part of your treatment and safety. Be sure to make and go to all appointments, and call your doctor if you are having problems. It's also a good idea to know your test results and keep a list of the medicines you take. Where can you learn more? Go to http://yifanOximityryley.info/ Enter H199 in the search box to learn more about \"Learning About Menopause. \" Current as of: November 7, 2019Content Version: 12.4 © 4742-6421 [employer]. Care instructions adapted under license by [employer] (which disclaims liability or warranty for this information). If you have questions about a medical condition or this instruction, always ask your healthcare professional. [address] any warranty or liability for your use of this information. Menopause Diet: Care Instructions Your Care Instructions Healthy eating helps ease menopause symptoms. And it can reduce your risk for getting conditions such as osteoporosis and heart disease. Follow-up care is a key part of your treatment and safety. Be sure to make and go to all appointments, and call your doctor if you are having problems. It's also a good idea to know your test results and keep a list of the medicines you take. How can you care for yourself at home? · Limit fats in your diet. · Choose foods that have a lot of calcium. The recommended daily intake for adults ages 23 to 48 is 1,000 milligrams (mg). Adults over 50 need 1,200 mg a day.  If you don't get enough calcium in the foods you eat, ask your doctor if taking a supplement is right for you. · Add vitamin D to your daily diet. It helps your body use calcium. The recommended daily intake of vitamin D is 600 international units (IU) a day for children and adults up to age 79. Adults age 70 and older need 800 IU a day. If you don't get enough vitamin D in the foods you eat, ask your doctor if taking a supplement is right for you. · Include good sources of fiber in your diet each day. These include whole grains, beans, fruits, and vegetables. · Avoid simple sugars. This helps if you have mood swings, anxiety, or depression. · Avoid caffeine, or cut back on it. Caffeine can cause sleep problems. It can also make you feel anxious. To relieve these symptoms, pay attention to how much caffeine you are getting in drinks and chocolate. · Limit your intake of alcohol. For some women, drinking may make symptoms worse. Where can you learn more? Go to http://yifan-ryley.info/ Enter N021 in the search box to learn more about \"Menopause Diet: Care Instructions. \" Current as of: August 21, 2019Content Version: 12.4 © 5720-0809 [employer]. Care instructions adapted under license by [employer] (which disclaims liability or warranty for this information). If you have questions about a medical condition or this instruction, always ask your healthcare professional. [address] any warranty or liability for your use of this information. Hot Flashes During Menopause: Care Instructions Your Care Instructions A hot flash is a sudden feeling of intense body heat. Your head, neck, and chest may get red. Your heartbeat may speed up, and you may feel anxious. You may find that hot flashes occur more often in warm rooms or during stressful times.  Hot flashes and other symptoms are a normal response to the hormone changes that occur before your menstrual cycle goes away completely (menopause). Hot flashes often get better and go away with time. Some women take hormone pills or other medicine to treat bothersome symptoms. Follow-up care is a key part of your treatment and safety. Be sure to make and go to all appointments, and call your doctor if you are having problems. It's also a good idea to know your test results and keep a list of the medicines you take. How can you care for yourself at home? · If you decide to take medicine to treat hot flashes, take it exactly as prescribed. Call your doctor if you think you are having a problem with your medicine. You will get more details on the specific medicine your doctor prescribes. · Learn to meditate. Sit quietly and focus on your breathing. Try to practice each day. Books, classes, and tapes can help you start a program. 
· Wear natural fabrics, such as cotton and silk. Dress in layers so you can take off clothes as needed. · Keep the room temperature cool, or use a fan. You are more likely to have a hot flash when you are too warm than when you are cool. · Use fewer blankets when you sleep at night. · Drink cold fluids rather than hot ones. Limit your intake of caffeine and alcohol. · Eat smaller meals more often during the day so your body makes less heat than when digesting large amounts of food. Eat low-fat and high-fiber foods. · Do not smoke. Smoking can make hot flashes worse. If you need help quitting, talk to your doctor about stop-smoking programs and medicines. These can increase your chances of quitting for good. · Get at least 30 minutes of exercise on most days of the week. Walking is a good choice. You also may want to do other activities, such as running, swimming, cycling, or playing tennis or team sports. Where can you learn more? Go to http://yifan-ryley.info/ Enter F700 in the search box to learn more about \"Hot Flashes During Menopause: Care Instructions. \" 
 Current as of: November 7, 2019Content Version: 12.4 © 0274-7108 [employer]. Care instructions adapted under license by [employer] (which disclaims liability or warranty for this information). If you have questions about a medical condition or this instruction, always ask your healthcare professional. [address] any warranty or liability for your use of this information.

## 2020-05-15 LAB
ALBUMIN SERPL-MCNC: 4.2 G/DL (ref 3.8–4.9)
ALP SERPL-CCNC: 194 IU/L (ref 39–117)
ALT SERPL-CCNC: 52 IU/L (ref 0–32)
AST SERPL-CCNC: 40 IU/L (ref 0–40)
BILIRUB DIRECT SERPL-MCNC: 0.07 MG/DL (ref 0–0.4)
BILIRUB SERPL-MCNC: 0.3 MG/DL (ref 0–1.2)
BUN SERPL-MCNC: 12 MG/DL (ref 6–24)
BUN/CREAT SERPL: 18 (ref 9–23)
CALCIUM SERPL-MCNC: 9.7 MG/DL (ref 8.7–10.2)
CHLORIDE SERPL-SCNC: 101 MMOL/L (ref 96–106)
CHOLEST SERPL-MCNC: 200 MG/DL (ref 100–199)
CO2 SERPL-SCNC: 26 MMOL/L (ref 20–29)
CREAT SERPL-MCNC: 0.68 MG/DL (ref 0.57–1)
GLUCOSE SERPL-MCNC: 92 MG/DL (ref 65–99)
HDLC SERPL-MCNC: 75 MG/DL
INTERPRETATION, 910389: NORMAL
LDLC SERPL CALC-MCNC: 113 MG/DL (ref 0–99)
POTASSIUM SERPL-SCNC: 3.5 MMOL/L (ref 3.5–5.2)
PROT SERPL-MCNC: 7.4 G/DL (ref 6–8.5)
SODIUM SERPL-SCNC: 139 MMOL/L (ref 134–144)
TRIGL SERPL-MCNC: 59 MG/DL (ref 0–149)
VLDLC SERPL CALC-MCNC: 12 MG/DL (ref 5–40)

## 2020-05-20 DIAGNOSIS — R79.89 ELEVATED LFTS: Primary | ICD-10-CM

## 2020-08-20 DIAGNOSIS — R79.89 ELEVATED LFTS: ICD-10-CM

## 2020-09-05 LAB
ALBUMIN SERPL-MCNC: 4.3 G/DL (ref 3.8–4.9)
ALP SERPL-CCNC: 121 IU/L (ref 39–117)
ALT SERPL-CCNC: 14 IU/L (ref 0–32)
AST SERPL-CCNC: 18 IU/L (ref 0–40)
BILIRUB DIRECT SERPL-MCNC: 0.08 MG/DL (ref 0–0.4)
BILIRUB SERPL-MCNC: <0.2 MG/DL (ref 0–1.2)
PROT SERPL-MCNC: 7.5 G/DL (ref 6–8.5)

## 2020-09-13 ENCOUNTER — TELEPHONE (OUTPATIENT)
Dept: FAMILY MEDICINE CLINIC | Age: 55
End: 2020-09-13

## 2020-09-13 DIAGNOSIS — R79.89 ELEVATED LFTS: Primary | ICD-10-CM

## 2020-09-13 DIAGNOSIS — R79.89 ELEVATED LFTS: ICD-10-CM

## 2020-09-17 ENCOUNTER — HOSPITAL ENCOUNTER (OUTPATIENT)
Dept: ULTRASOUND IMAGING | Age: 55
Discharge: HOME | End: 2020-09-17
Attending: FAMILY MEDICINE
Payer: COMMERCIAL

## 2020-09-17 DIAGNOSIS — R79.89 ELEVATED LFTS: ICD-10-CM

## 2020-09-17 PROCEDURE — 76700 US EXAM ABDOM COMPLETE: CPT

## 2020-09-20 ENCOUNTER — TELEPHONE (OUTPATIENT)
Dept: FAMILY MEDICINE CLINIC | Age: 55
End: 2020-09-20

## 2020-12-09 ENCOUNTER — TRANSCRIBE ORDER (OUTPATIENT)
Dept: SCHEDULING | Age: 55
End: 2020-12-09

## 2020-12-09 DIAGNOSIS — Z12.31 VISIT FOR SCREENING MAMMOGRAM: Primary | ICD-10-CM

## 2020-12-11 ENCOUNTER — HOSPITAL ENCOUNTER (OUTPATIENT)
Dept: MAMMOGRAPHY | Age: 55
Discharge: HOME | End: 2020-12-11
Attending: FAMILY MEDICINE
Payer: COMMERCIAL

## 2020-12-11 DIAGNOSIS — Z12.31 VISIT FOR SCREENING MAMMOGRAM: ICD-10-CM

## 2020-12-11 PROCEDURE — 77067 SCR MAMMO BI INCL CAD: CPT

## 2020-12-15 ENCOUNTER — OFFICE VISIT (OUTPATIENT)
Dept: FAMILY MEDICINE CLINIC | Age: 55
End: 2020-12-15
Payer: COMMERCIAL

## 2020-12-15 VITALS
HEART RATE: 84 BPM | TEMPERATURE: 98 F | DIASTOLIC BLOOD PRESSURE: 76 MMHG | RESPIRATION RATE: 20 BRPM | SYSTOLIC BLOOD PRESSURE: 134 MMHG | BODY MASS INDEX: 28.66 KG/M2 | WEIGHT: 172 LBS | HEIGHT: 65 IN

## 2020-12-15 DIAGNOSIS — R30.9 URINARY PAIN: ICD-10-CM

## 2020-12-15 DIAGNOSIS — R39.9 UTI SYMPTOMS: ICD-10-CM

## 2020-12-15 DIAGNOSIS — R35.0 URINARY FREQUENCY: ICD-10-CM

## 2020-12-15 DIAGNOSIS — R35.0 URINARY FREQUENCY: Primary | ICD-10-CM

## 2020-12-15 LAB
BILIRUB UR QL STRIP: NEGATIVE
GLUCOSE UR-MCNC: NEGATIVE MG/DL
KETONES P FAST UR STRIP-MCNC: NEGATIVE MG/DL
PH UR STRIP: 5.5 [PH] (ref 4.6–8)
PROT UR QL STRIP: NEGATIVE
SP GR UR STRIP: 1 (ref 1–1.03)
UA UROBILINOGEN AMB POC: NORMAL (ref 0.2–1)
URINALYSIS CLARITY POC: CLEAR
URINALYSIS COLOR POC: YELLOW
URINE BLOOD POC: NORMAL
URINE LEUKOCYTES POC: NORMAL
URINE NITRITES POC: NEGATIVE

## 2020-12-15 PROCEDURE — 81003 URINALYSIS AUTO W/O SCOPE: CPT | Performed by: FAMILY MEDICINE

## 2020-12-15 PROCEDURE — 99213 OFFICE O/P EST LOW 20 MIN: CPT | Performed by: FAMILY MEDICINE

## 2020-12-15 RX ORDER — CEPHALEXIN 500 MG/1
500 CAPSULE ORAL 2 TIMES DAILY
Qty: 10 CAP | Refills: 0 | Status: SHIPPED | OUTPATIENT
Start: 2020-12-15 | End: 2020-12-20

## 2020-12-15 RX ORDER — PHENAZOPYRIDINE HYDROCHLORIDE 100 MG/1
100 TABLET, FILM COATED ORAL
Qty: 9 TAB | Refills: 0 | Status: SHIPPED | OUTPATIENT
Start: 2020-12-15 | End: 2020-12-18

## 2020-12-15 NOTE — PATIENT INSTRUCTIONS
Treat empirically for UTI Send for culture At next office visit prove clearance of hematuria with poc dipstick if possible If symptoms not improved or worsening check back

## 2020-12-15 NOTE — PROGRESS NOTES
Family Medicine Acute Visit Progress Note  Patient: Jyotsna Lott  1965, 48 y.o., female  Encounter Date: 12/15/2020    ASSESSMENT & PLAN    ICD-10-CM ICD-9-CM    1. Urinary frequency  R35.0 788.41 AMB POC URINALYSIS DIP STICK AUTO W/O MICRO      CULTURE, URINE   2. Urinary pain  R30.9 788.1 AMB POC URINALYSIS DIP STICK AUTO W/O MICRO      CULTURE, URINE   3. UTI symptoms  R39.9 788.99 CULTURE, URINE       Orders Placed This Encounter    CULTURE, URINE     Standing Status:   Future     Standing Expiration Date:   12/16/2021    AMB POC URINALYSIS DIP STICK AUTO W/O MICRO    cephALEXin (Keflex) 500 mg capsule     Sig: Take 1 Cap by mouth two (2) times a day for 5 days. Dispense:  10 Cap     Refill:  0    phenazopyridine (PYRIDIUM) 100 mg tablet     Sig: Take 1 Tab by mouth three (3) times daily (after meals) for 3 days. Dispense:  9 Tab     Refill:  0       Patient Instructions   Treat empirically for UTI  Send for culture  At next office visit prove clearance of hematuria with poc dipstick if possible    If symptoms not improved or worsening check back      CHIEF COMPLAINT  Chief Complaint   Patient presents with    Urinary Frequency    Urinary Pain       SUBJECTIVE  Jyotsna Lott is a 48 y.o. female presenting today for acute symptoms of UTI    Urinary frequency, urgency, waking up from sleep   Symptoms of UTI  No gross blood  No stone signs    No vaginal itching or discharge    No new sexual partners  Feels like past UTI    Review of Systems  A 12 point review of systems was negative except as noted here or in the HPI. OBJECTIVE  Visit Vitals  /76   Pulse 84   Temp 98 °F (36.7 °C) (Temporal)   Resp 20   Ht 5' 5\" (1.651 m)   Wt 172 lb (78 kg)   LMP 11/15/2019   BMI 28.62 kg/m²       Physical Exam  Constitutional:       General: She is not in acute distress. Appearance: Normal appearance. She is not ill-appearing, toxic-appearing or diaphoretic.    HENT:      Head: Normocephalic and atraumatic. Right Ear: External ear normal.      Left Ear: External ear normal.      Mouth/Throat:      Comments: masked  Eyes:      General: No scleral icterus. Right eye: No discharge. Left eye: No discharge. Comments: eom grossly intact   Neck:      Comments: No visible neck masses , ROM appears normal from visual inspection  Pulmonary:      Effort: Pulmonary effort is normal. No respiratory distress. Abdominal:      Tenderness: There is abdominal tenderness (suprapubic ttp). There is no right CVA tenderness or left CVA tenderness. Skin:     Comments: Visible skin is without jaundice, bruising, lesion, pallor, erythema or rash except as otherwise noted   Neurological:      General: No focal deficit present. Mental Status: She is alert and oriented to person, place, and time. Psychiatric:         Mood and Affect: Mood normal.         Behavior: Behavior normal.         Thought Content: Thought content normal.         Judgment: Judgment normal.         Results for orders placed or performed in visit on 12/15/20   AMB POC URINALYSIS DIP STICK AUTO W/O MICRO   Result Value Ref Range    Color (UA POC) Yellow     Clarity (UA POC) Clear     Glucose (UA POC) Negative Negative    Bilirubin (UA POC) Negative Negative    Ketones (UA POC) Negative Negative    Specific gravity (UA POC) 1.005 1.001 - 1.035    Blood (UA POC)      pH (UA POC) 5.5 4.6 - 8.0    Protein (UA POC) Negative Negative    Urobilinogen (UA POC) 0.2 mg/dL 0.2 - 1    Nitrites (UA POC) Negative Negative    Leukocyte esterase (UA POC)         HISTORICAL  PMH, PSH, FHX, SOCHX, ALLERGIES and MEDS were reviewed and updated today. Johnathon Mccall MD  [facility]  12/15/20 10:59 AM    Portions of this note may have been populated using smart dictation software and may have \"sounds-like\" errors present. Pt was counseled on risks, benefits and alternatives of treatment options.  All questions were asked and answered and the patient was agreeable with the treatment plan as outlined.

## 2020-12-16 LAB
ALP BONE CFR SERPL: 36 % (ref 14–68)
ALP INTEST CFR SERPL: 3 % (ref 0–18)
ALP LIVER CFR SERPL: 61 % (ref 18–85)
ALP SERPL-CCNC: 154 IU/L (ref 39–117)
ANA TITR SER IF: NEGATIVE {TITER}
FERRITIN SERPL-MCNC: 26 NG/ML (ref 15–150)
HBV SURFACE AG SERPL QL IA: NEGATIVE
HCV AB S/CO SERPL IA: <0.1 S/CO RATIO (ref 0–0.9)
HCV AB SERPL QL IA: NORMAL
MITOCHONDRIA M2 IGG SER-ACNC: <20 UNITS (ref 0–20)
PLEASE NOTE, 734348: NORMAL

## 2020-12-18 LAB — BACTERIA UR CULT: ABNORMAL

## 2020-12-18 NOTE — PROGRESS NOTES
Not a clear uti, there was a low count of colony forming units  It would be oK to stop antibiotics however the ones given would appropriately treat the bug that grew out in the culutre

## 2020-12-30 ENCOUNTER — NURSE TRIAGE (OUTPATIENT)
Dept: OTHER | Facility: CLINIC | Age: 55
End: 2020-12-30

## 2020-12-30 DIAGNOSIS — E87.6 HYPOKALEMIA: ICD-10-CM

## 2020-12-30 RX ORDER — POTASSIUM CHLORIDE 20 MEQ/1
TABLET, EXTENDED RELEASE ORAL
Qty: 90 TAB | Refills: 0 | Status: SHIPPED | OUTPATIENT
Start: 2020-12-30 | End: 2021-03-30

## 2020-12-30 NOTE — TELEPHONE ENCOUNTER
Only wanted to set up testing. Didn't want to go through exposure assessment. Has no symptoms and said she called her PCPs office and they sent her to nurse triage. Warm transfer to [facility] to schedule testing. Reason for Disposition   Information only question and nurse able to answer    Answer Assessment - Initial Assessment Questions  1. REASON FOR CALL or QUESTION: \"What is your reason for calling today? \" or \"How can I best help you? \" or \"What question do you have that I can help answer? \"      I want to schedule covid testing. Protocols used: INFORMATION ONLY CALL - NO TRIAGE-ADULT-[place]    Care advice provided.

## 2021-01-27 ENCOUNTER — VIRTUAL VISIT (OUTPATIENT)
Dept: FAMILY MEDICINE CLINIC | Age: 56
End: 2021-01-27
Payer: COMMERCIAL

## 2021-01-27 DIAGNOSIS — K22.89 ESOPHAGEAL PAIN: ICD-10-CM

## 2021-01-27 DIAGNOSIS — R07.89 CHEST WALL PAIN: Primary | ICD-10-CM

## 2021-01-27 PROCEDURE — 99213 OFFICE O/P EST LOW 20 MIN: CPT | Performed by: FAMILY MEDICINE

## 2021-01-27 RX ORDER — BLACK COHOSH ROOT 540 MG
CAPSULE ORAL
COMMUNITY

## 2021-01-27 RX ORDER — VITAMIN E CAP 100 UNIT 100 UNIT
180 CAP ORAL DAILY
COMMUNITY

## 2021-01-27 NOTE — PROGRESS NOTES
Marilu William is a 48 y.o. female who was seen by synchronous (real-time) audio-video technology on 1/27/2021. Consent: Marilu William, who was seen by synchronous (real-time) audio-video technology, and/or her healthcare decision maker, is aware that this patient-initiated, Telehealth encounter on 1/27/2021 is a billable service, with coverage as determined by her insurance carrier. She is aware that she may receive a bill and has provided verbal consent to proceed: Yes. Assessment & Plan:   1. Chest wall pain  Costochondritis vs referred pain  Supportive care    2. Esophageal pain  Likely d/t quickly eating large bites  Getting better  Avoid irritants/triggering foods  If not improved sufficiently GI for consideration of endoscopy  Touched on risk of stricture/web that could cause recurrence so patient is aware  Chew food well  Avoid bubbly bevs, hot temp foods, spicy foods, acidic foods etc for time being  Ok to use otcs if they help           Pt was counseled on risks, benefits and alternatives of treatment options. All questions were asked and answered and the patient was agreeable with the treatment plan as outlined. Subjective:   Marilu William is a 48 y.o. female who was seen for Chest Pain (x 6 days.  Tenderness in mid chest area, started after eating.)      5 or 6 days ago she ate fast and she's been taking otc (alkaseltzer, tums, ginger ale) and she has some tenderness in her chest  [facility], she [facility] E[facility], cxr and blood work and they recommended she had \"intercostal pain\"   She can make herself sore by pressing and she has some inside pain  Right now eating and drinking ok without any difficulty  No acid reflux symptoms including burning or something bubbling up    Pain is worse when laying down but it is progressively improving     She had blackforest ham with swiss on wheat bread, oatmeal and raisin cookie with a diet soda    Medications, allergies, PMH, PSH, SOCH, [facility] reviewed and updated per routine protocol, see chart for review and changes if not noted here. ROS  A 12 point review of systems was negative except as noted here or in the HPI. Objective:   Vital Signs: (As obtained by patient/caregiver at home)  Patient-Reported Vitals 1/27/2021   Patient-Reported Weight 171lb   Patient-Reported Temperature 97.2        [INSTRUCTIONS:  \"[x]\" Indicates a positive item  \"[]\" Indicates a negative item  -- DELETE ALL ITEMS NOT EXAMINED]    Constitutional: [x] Appears well-developed and well-nourished [x] No apparent distress      [] Abnormal -     Mental status: [x] Alert and awake  [x] Oriented to person/place/time [x] Able to follow commands    [] Abnormal -     Eyes:   EOM    [x]  Normal    [] Abnormal -   Sclera  [x]  Normal    [] Abnormal -          Discharge [x]  None visible   [] Abnormal -     HENT: [x] Normocephalic, atraumatic  [] Abnormal -   [x] Mouth/Throat: Mucous membranes are moist    External Ears [x] Normal  [] Abnormal -    Neck: [x] No visualized mass [] Abnormal -     Pulmonary/Chest: [x] Respiratory effort normal   [x] No visualized signs of difficulty breathing or respiratory distress        [] Abnormal -      Musculoskeletal:   [x] Normal gait with no signs of ataxia         [x] Normal range of motion of neck        [] Abnormal -     Neurological:        [x] No Facial Asymmetry (Cranial nerve 7 motor function) (limited exam due to video visit)          [x] No gaze palsy        [] Abnormal -          Skin:        [x] No significant exanthematous lesions or discoloration noted on facial skin         [] Abnormal -            Psychiatric:       [x] Normal Affect [] Abnormal -        [x] No Hallucinations    Other pertinent observable physical exam findings:well appearing    We discussed the expected course, resolution and complications of the diagnosis(es) in detail.   Medication risks, benefits, costs, interactions, and alternatives were discussed as indicated. I advised her to contact the office if her condition worsens, changes or fails to improve as anticipated. She expressed understanding with the diagnosis(es) and plan. Delbert Funk is a 48 y.o. female who was evaluated by a video visit encounter for concerns as above. Patient identification was verified prior to start of the visit. A caregiver was present when appropriate. Due to this being a TeleHealth encounter (During [address] emergency), evaluation of the following organ systems was limited: Vitals/Constitutional/EENT/Resp/CV/GI//MS/Neuro/Skin/Heme-Lymph-Imm. Pursuant to the emergency declaration under the [facility], 1135 waiver authority and the [employer] and [employer] Act, this Virtual  Visit was conducted, with patient's (and/or legal guardian's) consent, to reduce the patient's risk of exposure to COVID-19 and provide necessary medical care. Services were provided through a video synchronous discussion virtually to substitute for in-person clinic visit. Patient and provider were located at their individual homes. Mono Frausto MD  [facility]  01/27/21 10:56 AM     Portions of this note may have been populated using smart dictation software and may have \"sounds-like\" errors present.

## 2021-01-27 NOTE — PROGRESS NOTES
Chief Complaint   Patient presents with    Chest Pain     x 6 days. 1. Have you been to the ER, urgent care clinic since your last visit? Hospitalized since your last visit? Yes, 01/25/2021 [facility], chest discomfort. 2. Have you seen or consulted any other health care providers outside of the [address] [place] [address] since your last visit? Include any pap smears or colon screening.  No

## 2021-02-15 DIAGNOSIS — I10 BENIGN ESSENTIAL HTN: ICD-10-CM

## 2021-02-15 RX ORDER — HYDROCHLOROTHIAZIDE 25 MG/1
25 TABLET ORAL DAILY
Qty: 90 TAB | Refills: 0 | Status: SHIPPED | OUTPATIENT
Start: 2021-02-15 | End: 2021-03-02 | Stop reason: SDUPTHER

## 2021-02-15 NOTE — LETTER
2/16/2021 8:47 AM 
 
Ms[address] 
[phone] Dear Ms. Kimo: 
 
We've missed you! Please call our office at [phone] and schedule a follow up appointment for your continued care, with in the next 60 days. . We are also offering virtual appointments as well. Look forward to seeing you soon.   
 
 
 
Sincerely, 
 
 
Aaron Perkins MD

## 2021-03-02 ENCOUNTER — VIRTUAL VISIT (OUTPATIENT)
Dept: FAMILY MEDICINE CLINIC | Age: 56
End: 2021-03-02
Payer: COMMERCIAL

## 2021-03-02 DIAGNOSIS — I10 BENIGN ESSENTIAL HTN: Primary | ICD-10-CM

## 2021-03-02 DIAGNOSIS — I10 BENIGN ESSENTIAL HTN: ICD-10-CM

## 2021-03-02 PROCEDURE — 99213 OFFICE O/P EST LOW 20 MIN: CPT | Performed by: FAMILY MEDICINE

## 2021-03-02 RX ORDER — HYDROCHLOROTHIAZIDE 25 MG/1
25 TABLET ORAL DAILY
Qty: 90 TAB | Refills: 1 | Status: SHIPPED | OUTPATIENT
Start: 2021-03-02 | End: 2021-10-19 | Stop reason: SDUPTHER

## 2021-03-02 NOTE — PROGRESS NOTES
Chief Complaint   Patient presents with    Hypertension     Follow up     1. Have you been to the ER, urgent care clinic since your last visit? Hospitalized since your last visit? No    2. Have you seen or consulted any other health care providers outside of the [address] [place] [address] since your last visit? Include any pap smears or colon screening.  No

## 2021-03-02 NOTE — PROGRESS NOTES
Najma Dawn is a 48 y.o. female who was seen by synchronous (real-time) audio-video technology on 3/2/2021. Assessment & Plan:   Diagnoses and all orders for this visit:    1. Benign essential HTN  -     METABOLIC PANEL, BASIC; Future  -     hydroCHLOROthiazide (HYDRODIURIL) 25 mg tablet; Take 1 Tab by mouth daily. Fairly well controlled, DBP borderline  Labs per orders. Will follow BP  Continue current plans. Refills per orders    Follow-up and Dispositions    · Return in about 6 months (around 9/2/2021) for blood pressure. Reviewed plan of care. Patient has provided input and agrees with goals. CPT Codes 97993-60374 for Established Patients may apply to this Telehealth Visit      Subjective:   Najma Dawn was seen for Hypertension (Follow up)      Najma Dawn is here to follow up on their HTN. This is a chronic problem. The problem occurs constantly and is stable. The symptoms are relieved by hydrochlorothiazide, which is/are working well. Review of Systems   Eyes: Negative for blurred vision. Respiratory: Negative for shortness of breath. Cardiovascular: Negative for chest pain. Neurological: Negative for dizziness, sensory change, speech change, focal weakness and headaches. Objective:   /89, P 72  BP Readings from Last 3 Encounters:   12/15/20 134/76   04/29/20 131/89   10/28/19 138/88       Physical Exam  Constitutional:       General: She is not in acute distress. Appearance: Normal appearance. Neurological:      Mental Status: She is alert and oriented to person, place, and time. Due to this being a TeleHealth evaluation, many elements of the physical examination are unable to be assessed. We discussed the expected course, resolution and complications of the diagnosis(es) in detail. Medication risks, benefits, costs, interactions, and alternatives were discussed as indicated.   I advised her to contact the office if her condition worsens, changes or fails to improve as anticipated. She expressed understanding with the diagnosis(es) and plan. Pursuant to the emergency declaration under the [facility], [facility] waiver authority and the [employer] and [employer] Act, this Virtual  Visit was conducted, with patient's consent, to reduce the patient's risk of exposure to COVID-19 and provide continuity of care for an established patient. Services were provided through a video synchronous discussion virtually to substitute for in-person clinic visit.     Bailey Kruse MD

## 2021-03-30 DIAGNOSIS — E87.6 HYPOKALEMIA: ICD-10-CM

## 2021-03-30 RX ORDER — POTASSIUM CHLORIDE 20 MEQ/1
TABLET, EXTENDED RELEASE ORAL
Qty: 90 TAB | Refills: 0 | Status: SHIPPED | OUTPATIENT
Start: 2021-03-30 | End: 2021-07-03 | Stop reason: SDUPTHER

## 2021-07-03 DIAGNOSIS — E87.6 HYPOKALEMIA: ICD-10-CM

## 2021-07-07 LAB
BUN SERPL-MCNC: 11 MG/DL (ref 6–24)
BUN/CREAT SERPL: 16 (ref 9–23)
CALCIUM SERPL-MCNC: 9.4 MG/DL (ref 8.7–10.2)
CHLORIDE SERPL-SCNC: 102 MMOL/L (ref 96–106)
CO2 SERPL-SCNC: 27 MMOL/L (ref 20–29)
CREAT SERPL-MCNC: 0.69 MG/DL (ref 0.57–1)
GLUCOSE SERPL-MCNC: 87 MG/DL (ref 65–99)
POTASSIUM SERPL-SCNC: 3.5 MMOL/L (ref 3.5–5.2)
SODIUM SERPL-SCNC: 143 MMOL/L (ref 134–144)

## 2021-07-08 RX ORDER — POTASSIUM CHLORIDE 20 MEQ/1
TABLET, EXTENDED RELEASE ORAL
Qty: 90 TABLET | Refills: 3 | Status: SHIPPED | OUTPATIENT
Start: 2021-07-08 | End: 2022-03-29 | Stop reason: SDUPTHER

## 2021-09-27 ENCOUNTER — OFFICE VISIT (OUTPATIENT)
Dept: FAMILY MEDICINE CLINIC | Age: 56
End: 2021-09-27
Payer: COMMERCIAL

## 2021-09-27 VITALS
HEART RATE: 63 BPM | DIASTOLIC BLOOD PRESSURE: 60 MMHG | RESPIRATION RATE: 20 BRPM | OXYGEN SATURATION: 100 % | TEMPERATURE: 97.5 F | BODY MASS INDEX: 28.32 KG/M2 | WEIGHT: 170 LBS | SYSTOLIC BLOOD PRESSURE: 122 MMHG | HEIGHT: 65 IN

## 2021-09-27 DIAGNOSIS — J45.909 ASTHMA: ICD-10-CM

## 2021-09-27 DIAGNOSIS — I10 BENIGN ESSENTIAL HTN: Primary | ICD-10-CM

## 2021-09-27 DIAGNOSIS — E78.00 HYPERCHOLESTEREMIA: ICD-10-CM

## 2021-09-27 PROCEDURE — 99214 OFFICE O/P EST MOD 30 MIN: CPT | Performed by: FAMILY MEDICINE

## 2021-09-27 RX ORDER — ALBUTEROL SULFATE 90 UG/1
2 AEROSOL, METERED RESPIRATORY (INHALATION)
Qty: 1 EACH | Refills: 0 | Status: SHIPPED | OUTPATIENT
Start: 2021-09-27

## 2021-09-27 NOTE — PROGRESS NOTES
HISTORY OF PRESENT ILLNESS  Srinivas Castillo is a 55 y.o. female. Patient presents with:  Hypertension: followup           Review of Systems   Eyes: Negative for blurred vision. Respiratory: Negative for cough, shortness of breath and wheezing. Cardiovascular: Negative for chest pain. Neurological: Negative for dizziness, sensory change, speech change, focal weakness and headaches. Visit Vitals  /60 Comment: right arm, manual cuff   Pulse 63   Temp 97.5 °F (36.4 °C) (Temporal)   Resp 20   Ht 5' 5\" (1.651 m)   Wt 170 lb (77.1 kg)   LMP 11/15/2019   SpO2 100%   BMI 28.29 kg/m²     BP Readings from Last 3 Encounters:   09/27/21 (!) 142/85   12/15/20 134/76   04/29/20 131/89       Physical Exam  Vitals and nursing note reviewed. Constitutional:       General: She is not in acute distress. Appearance: She is well-developed. She is not diaphoretic. Cardiovascular:      Rate and Rhythm: Normal rate and regular rhythm. Heart sounds: Normal heart sounds. No murmur heard. No friction rub. No gallop. Pulmonary:      Effort: Pulmonary effort is normal. No respiratory distress. Breath sounds: Normal breath sounds. No wheezing or rales. Skin:     General: Skin is warm and dry. Neurological:      Mental Status: She is alert and oriented to person, place, and time. ASSESSMENT and PLAN    ICD-10-CM ICD-9-CM    1. Benign essential HTN  T93 829.3 METABOLIC PANEL, BASIC      METABOLIC PANEL, BASIC   2. Asthma  J45.909 493.90 albuterol (ProAir HFA) 90 mcg/actuation inhaler   3. Hypercholesteremia  E78.00 272.0 LIPID PANEL      HEPATIC FUNCTION PANEL      LIPID PANEL      HEPATIC FUNCTION PANEL        Blood pressure and asthma controlled  Labs per orders. Continue current plans. Refills per orders    Follow-up and Dispositions    · Return in about 6 months (around 3/27/2022) for blood pressure. Reviewed plan of care.   Patient has provided input and agrees with goals.

## 2021-09-29 LAB
ALBUMIN SERPL-MCNC: 4.2 G/DL (ref 3.8–4.9)
ALP SERPL-CCNC: 117 IU/L (ref 44–121)
ALT SERPL-CCNC: 14 IU/L (ref 0–32)
AST SERPL-CCNC: 18 IU/L (ref 0–40)
BILIRUB DIRECT SERPL-MCNC: <0.1 MG/DL (ref 0–0.4)
BILIRUB SERPL-MCNC: 0.3 MG/DL (ref 0–1.2)
BUN SERPL-MCNC: 10 MG/DL (ref 6–24)
BUN/CREAT SERPL: 13 (ref 9–23)
CALCIUM SERPL-MCNC: 9.5 MG/DL (ref 8.7–10.2)
CHLORIDE SERPL-SCNC: 102 MMOL/L (ref 96–106)
CHOLEST SERPL-MCNC: 212 MG/DL (ref 100–199)
CO2 SERPL-SCNC: 29 MMOL/L (ref 20–29)
CREAT SERPL-MCNC: 0.8 MG/DL (ref 0.57–1)
GLUCOSE SERPL-MCNC: 90 MG/DL (ref 65–99)
HDLC SERPL-MCNC: 78 MG/DL
IMP & REVIEW OF LAB RESULTS: NORMAL
LDLC SERPL CALC-MCNC: 121 MG/DL (ref 0–99)
POTASSIUM SERPL-SCNC: 3.8 MMOL/L (ref 3.5–5.2)
PROT SERPL-MCNC: 7 G/DL (ref 6–8.5)
SODIUM SERPL-SCNC: 143 MMOL/L (ref 134–144)
TRIGL SERPL-MCNC: 73 MG/DL (ref 0–149)
VLDLC SERPL CALC-MCNC: 13 MG/DL (ref 5–40)

## 2021-10-19 DIAGNOSIS — I10 BENIGN ESSENTIAL HTN: ICD-10-CM

## 2021-10-21 DIAGNOSIS — I10 BENIGN ESSENTIAL HTN: ICD-10-CM

## 2021-10-21 RX ORDER — HYDROCHLOROTHIAZIDE 25 MG/1
25 TABLET ORAL DAILY
Qty: 90 TABLET | Refills: 3 | Status: SHIPPED | OUTPATIENT
Start: 2021-10-21 | End: 2021-10-24 | Stop reason: SDUPTHER

## 2021-10-24 RX ORDER — HYDROCHLOROTHIAZIDE 25 MG/1
TABLET ORAL
Qty: 90 TABLET | Refills: 3 | Status: SHIPPED | OUTPATIENT
Start: 2021-10-24 | End: 2022-02-24 | Stop reason: SDUPTHER

## 2021-12-22 ENCOUNTER — TRANSCRIBE ORDER (OUTPATIENT)
Dept: SCHEDULING | Age: 56
End: 2021-12-22

## 2021-12-22 DIAGNOSIS — Z12.31 SCREENING MAMMOGRAM FOR HIGH-RISK PATIENT: Primary | ICD-10-CM

## 2021-12-23 ENCOUNTER — HOSPITAL ENCOUNTER (OUTPATIENT)
Dept: MAMMOGRAPHY | Age: 56
Discharge: HOME | End: 2021-12-23
Attending: FAMILY MEDICINE
Payer: COMMERCIAL

## 2021-12-23 DIAGNOSIS — Z12.31 SCREENING MAMMOGRAM FOR HIGH-RISK PATIENT: ICD-10-CM

## 2021-12-23 PROCEDURE — 77067 SCR MAMMO BI INCL CAD: CPT

## 2022-02-19 ENCOUNTER — PATIENT MESSAGE (OUTPATIENT)
Dept: FAMILY MEDICINE CLINIC | Age: 57
End: 2022-02-19

## 2022-02-19 DIAGNOSIS — I10 BENIGN ESSENTIAL HTN: ICD-10-CM

## 2022-02-21 DIAGNOSIS — I10 BENIGN ESSENTIAL HTN: ICD-10-CM

## 2022-02-21 RX ORDER — HYDROCHLOROTHIAZIDE 25 MG/1
25 TABLET ORAL DAILY
Qty: 90 TABLET | Refills: 3 | Status: CANCELLED | OUTPATIENT
Start: 2022-02-21

## 2022-02-24 RX ORDER — HYDROCHLOROTHIAZIDE 25 MG/1
25 TABLET ORAL DAILY
Qty: 90 TABLET | Refills: 1 | Status: SHIPPED | OUTPATIENT
Start: 2022-02-24 | End: 2022-03-29 | Stop reason: SDUPTHER

## 2022-02-24 NOTE — TELEPHONE ENCOUNTER
From: Keyona Brennan  To: General Call, MD  Sent: 2/19/2022 4:35 PM EST  Subject: HydroChlorothiazide    Good Afternoon:    I have three (3) refills at [facility] for the HdroChlorothiazide however it shows as Inactive on the [facility] website. I carol to [facility] and they told me to contact my doctor to re-send prescription. Thanks.

## 2022-03-29 ENCOUNTER — VIRTUAL VISIT (OUTPATIENT)
Dept: FAMILY MEDICINE CLINIC | Age: 57
End: 2022-03-29
Payer: COMMERCIAL

## 2022-03-29 ENCOUNTER — TELEPHONE (OUTPATIENT)
Dept: FAMILY MEDICINE CLINIC | Age: 57
End: 2022-03-29

## 2022-03-29 DIAGNOSIS — D57.3 SICKLE CELL TRAIT (HCC): ICD-10-CM

## 2022-03-29 DIAGNOSIS — Z23 ENCOUNTER FOR IMMUNIZATION: ICD-10-CM

## 2022-03-29 DIAGNOSIS — I10 BENIGN ESSENTIAL HTN: Primary | ICD-10-CM

## 2022-03-29 DIAGNOSIS — E87.6 HYPOKALEMIA: ICD-10-CM

## 2022-03-29 PROCEDURE — 99214 OFFICE O/P EST MOD 30 MIN: CPT | Performed by: FAMILY MEDICINE

## 2022-03-29 RX ORDER — ZOSTER VACCINE RECOMBINANT, ADJUVANTED 50 MCG/0.5
KIT INTRAMUSCULAR
Qty: 0.5 ML | Refills: 1 | Status: SHIPPED | OUTPATIENT
Start: 2022-03-29

## 2022-03-29 RX ORDER — POTASSIUM CHLORIDE 20 MEQ/1
TABLET, EXTENDED RELEASE ORAL
Qty: 90 TABLET | Refills: 4 | Status: SHIPPED | OUTPATIENT
Start: 2022-03-29

## 2022-03-29 RX ORDER — HYDROCHLOROTHIAZIDE 25 MG/1
25 TABLET ORAL DAILY
Qty: 90 TABLET | Refills: 4 | Status: SHIPPED | OUTPATIENT
Start: 2022-03-29

## 2022-03-29 NOTE — PROGRESS NOTES
Megha Contreras is a 55 y.o. female who was seen by synchronous (real-time) audio-video technology on 3/29/2022. Assessment & Plan:   Diagnoses and all orders for this visit:    1. Benign essential HTN  -     hydroCHLOROthiazide (HYDRODIURIL) 25 mg tablet; Take 1 Tablet by mouth daily.  -     METABOLIC PANEL, BASIC; Future    2. Hypokalemia  -     potassium chloride (Klor-Con M20) 20 mEq tablet; TAKE 1 TABLET BY MOUTH EVERY DAY  -     METABOLIC PANEL, BASIC; Future    3. Sickle cell trait (HCC)  -     CBC WITH AUTOMATED DIFF; Future    4. Encounter for immunization  -     pneumococcal 23-valent (PNEUMOVAX 23) 25 mcg/0.5 mL injection; 0.5 mL by IntraMUSCular route once for 1 dose.  -     varicella-zoster recombinant, PF, (Shingrix, PF,) 50 mcg/0.5 mL susr injection; 0.5 ml IM once; repeat in one to six months        Blood pressure controlled  Labs per orders. Continue current plans. Refills per orders  Shingrix and Pneumovax at pharmacy    Follow-up and Dispositions    · Return in about 6 months (around 9/29/2022) for blood pressure. Reviewed plan of care. Patient has provided input and agrees with goals. CPT Codes 59099-27940 for Established Patients may apply to this Telehealth Visit      Subjective:   Megha Contreras was seen for Hypertension (follow up )      Patient presents with:  Hypertension: follow up             Review of Systems   Eyes: Negative for blurred vision. Respiratory: Negative for shortness of breath. Cardiovascular: Negative for chest pain. Neurological: Negative for dizziness, sensory change, speech change, focal weakness and headaches. Objective:   /83, P 70    Physical Exam  Constitutional:       General: She is not in acute distress. Appearance: Normal appearance. Neurological:      Mental Status: She is alert and oriented to person, place, and time.          Due to this being a TeleHealth evaluation, many elements of the physical examination are unable to be assessed. We discussed the expected course, resolution and complications of the diagnosis(es) in detail. Medication risks, benefits, costs, interactions, and alternatives were discussed as indicated. I advised her to contact the office if her condition worsens, changes or fails to improve as anticipated. She expressed understanding with the diagnosis(es) and plan. Pursuant to the emergency declaration under the [address] [place] [address] waiver authority and the [employer] Act, this Virtual  Visit was conducted, with patient's consent, to reduce the patient's risk of exposure to COVID-19 and provide continuity of care for an established patient. Services were provided through a video synchronous discussion virtually to substitute for in-person clinic visit.     Debbie Posey MD

## 2023-02-03 ENCOUNTER — TRANSCRIBE ORDER (OUTPATIENT)
Dept: SCHEDULING | Age: 58
End: 2023-02-03

## 2023-02-03 DIAGNOSIS — Z12.31 BREAST CANCER SCREENING BY MAMMOGRAM: Primary | ICD-10-CM

## 2023-03-20 ENCOUNTER — HOSPITAL ENCOUNTER (OUTPATIENT)
Dept: MAMMOGRAPHY | Age: 58
Discharge: HOME | End: 2023-03-20
Attending: FAMILY MEDICINE
Payer: COMMERCIAL

## 2023-03-20 DIAGNOSIS — Z12.31 BREAST CANCER SCREENING BY MAMMOGRAM: ICD-10-CM

## 2023-03-20 PROCEDURE — 77067 SCR MAMMO BI INCL CAD: CPT

## 2023-03-21 LAB
BASOPHILS # BLD AUTO: 0 X10E3/UL (ref 0–0.2)
BASOPHILS NFR BLD AUTO: 1 %
BUN SERPL-MCNC: 14 MG/DL (ref 6–24)
BUN/CREAT SERPL: 17 (ref 9–23)
CALCIUM SERPL-MCNC: 9.3 MG/DL (ref 8.7–10.2)
CHLORIDE SERPL-SCNC: 104 MMOL/L (ref 96–106)
CO2 SERPL-SCNC: 28 MMOL/L (ref 20–29)
CREAT SERPL-MCNC: 0.84 MG/DL (ref 0.57–1)
EGFRCR SERPLBLD CKD-EPI 2021: 82 ML/MIN/1.73
EOSINOPHIL # BLD AUTO: 0.1 X10E3/UL (ref 0–0.4)
EOSINOPHIL NFR BLD AUTO: 2 %
ERYTHROCYTE [DISTWIDTH] IN BLOOD BY AUTOMATED COUNT: 15.1 % (ref 11.7–15.4)
GLUCOSE SERPL-MCNC: 92 MG/DL (ref 70–99)
HCT VFR BLD AUTO: 42 % (ref 34–46.6)
HGB BLD-MCNC: 13.6 G/DL (ref 11.1–15.9)
IMM GRANULOCYTES # BLD AUTO: 0 X10E3/UL (ref 0–0.1)
IMM GRANULOCYTES NFR BLD AUTO: 0 %
LYMPHOCYTES # BLD AUTO: 1.4 X10E3/UL (ref 0.7–3.1)
LYMPHOCYTES NFR BLD AUTO: 30 %
MCH RBC QN AUTO: 23.7 PG (ref 26.6–33)
MCHC RBC AUTO-ENTMCNC: 32.4 G/DL (ref 31.5–35.7)
MCV RBC AUTO: 73 FL (ref 79–97)
MONOCYTES # BLD AUTO: 0.3 X10E3/UL (ref 0.1–0.9)
MONOCYTES NFR BLD AUTO: 7 %
NEUTROPHILS # BLD AUTO: 2.9 X10E3/UL (ref 1.4–7)
NEUTROPHILS NFR BLD AUTO: 60 %
PLATELET # BLD AUTO: 235 X10E3/UL (ref 150–450)
POTASSIUM SERPL-SCNC: 3.7 MMOL/L (ref 3.5–5.2)
RBC # BLD AUTO: 5.75 X10E6/UL (ref 3.77–5.28)
SODIUM SERPL-SCNC: 143 MMOL/L (ref 134–144)
WBC # BLD AUTO: 4.8 X10E3/UL (ref 3.4–10.8)

## 2023-04-24 ENCOUNTER — OFFICE VISIT (OUTPATIENT)
Dept: FAMILY MEDICINE CLINIC | Age: 58
End: 2023-04-24
Payer: COMMERCIAL

## 2023-04-24 VITALS
BODY MASS INDEX: 27.82 KG/M2 | WEIGHT: 167 LBS | HEART RATE: 62 BPM | SYSTOLIC BLOOD PRESSURE: 138 MMHG | DIASTOLIC BLOOD PRESSURE: 79 MMHG | HEIGHT: 65 IN | RESPIRATION RATE: 16 BRPM | TEMPERATURE: 97.8 F | OXYGEN SATURATION: 98 %

## 2023-04-24 DIAGNOSIS — Z23 ENCOUNTER FOR IMMUNIZATION: ICD-10-CM

## 2023-04-24 DIAGNOSIS — Z01.419 ENCOUNTER FOR GYNECOLOGICAL EXAMINATION WITHOUT ABNORMAL FINDING: Primary | ICD-10-CM

## 2023-04-24 DIAGNOSIS — I10 BENIGN ESSENTIAL HTN: ICD-10-CM

## 2023-04-24 PROCEDURE — 99396 PREV VISIT EST AGE 40-64: CPT | Performed by: FAMILY MEDICINE

## 2023-04-24 PROCEDURE — 3075F SYST BP GE 130 - 139MM HG: CPT | Performed by: FAMILY MEDICINE

## 2023-04-24 PROCEDURE — 3078F DIAST BP <80 MM HG: CPT | Performed by: FAMILY MEDICINE

## 2023-04-24 RX ORDER — ZOSTER VACCINE RECOMBINANT, ADJUVANTED 50 MCG/0.5
KIT INTRAMUSCULAR
Qty: 0.5 ML | Refills: 1 | Status: SHIPPED | OUTPATIENT
Start: 2023-04-24

## 2023-04-24 RX ORDER — TETANUS TOXOID, REDUCED DIPHTHERIA TOXOID AND ACELLULAR PERTUSSIS VACCINE, ADSORBED 5; 2.5; 8; 8; 2.5 [IU]/.5ML; [IU]/.5ML; UG/.5ML; UG/.5ML; UG/.5ML
0.5 SUSPENSION INTRAMUSCULAR ONCE
Qty: 0.5 ML | Refills: 0 | Status: SHIPPED | OUTPATIENT
Start: 2023-04-24 | End: 2023-04-24

## 2023-04-24 NOTE — PROGRESS NOTES
Subjective:   Ross Mcneil is a 65 y.o. y.o. female here for her annual routine Pap and checkup. Patient's last menstrual period was 11/15/2019. Social History: single partner, contraception - post menopausal status. Pertinent past medical hstory: hypertension, no history of HTN, DVT, CAD, DM, liver disease, migraines or smoking. 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  Household members:  3, patient, spouse, son  Last dental appointment:   this past January  Last eye exam:  this past February  Last colonoscopy:  3/2023  Uses seatbelts regularly :  yes  Getting regular exercise:  yes  Last mammogram:  3/2023    Patient Active Problem List    Diagnosis Date Noted    Overweight 02/15/2016    Benign essential HTN 06/10/2015    Sickle cell trait ([facility] [address] 11/28/2012    Seasonal allergic rhinitis 11/28/2012    Hypercholesteremia 09/05/2012    Asthma 09/05/2012     Current Outpatient Medications   Medication Sig Dispense Refill    hydroCHLOROthiazide (HYDRODIURIL) 25 mg tablet Take 1 Tablet by mouth daily. 90 Tablet 4    potassium chloride (Klor-Con M20) 20 mEq tablet TAKE 1 TABLET BY MOUTH EVERY DAY 90 Tablet 4    albuterol (ProAir HFA) 90 mcg/actuation inhaler Take 2 Puffs by inhalation every four (4) hours as needed for Wheezing. 1 Each 0    black cohosh 540 mg cap Take  by mouth.      vitamin e (E GEMS) 100 unit capsule Take 180 Units by mouth daily. LACTOBAC CMB #3/FOS/PANTETHINE (PROBIOTIC & ACIDOPHILUS PO) Take  by mouth. fluticasone (FLONASE) 50 mcg/actuation nasal spray 2 Sprays by Both Nostrils route daily. 1 Bottle 11     Allergies   Allergen Reactions    Latex Rash    Papaya Swelling     Swelling of lips.     Sulfa (Sulfonamide Antibiotics) Swelling     Past Medical History:   Diagnosis Date    Allergic rhinitis, cause unspecified 11/28/2012    Asthma 09/05/2012    Benign essential HTN 06/10/2015    Hypercholesteremia 09/05/2012    Overweight 02/15/2016    Sickle cell trait ([facility] [address] 11/28/2012     Past Surgical History:   Procedure Laterality Date    HX GYN      Cervical cryo     Family History   Problem Relation Age of Onset    Heart Disease Mother 79        CABG    Hypertension Mother     Asthma Brother     Cancer Maternal Grandmother         Ovarian     Social History     Tobacco Use    Smoking status: Never    Smokeless tobacco: Never   Substance Use Topics    Alcohol use: No        ROS:  Feeling well. No dyspnea or chest pain on exertion. No abdominal pain, change in bowel habits, black or bloody stools. No urinary tract symptoms. GYN ROS: normal menses, no abnormal bleeding, pelvic pain or discharge, no breast pain or new or enlarging lumps on self exam. No neurological complaints. Objective:  Visit Vitals  /79   Pulse 62   Temp 97.8 °F (36.6 °C)   Resp 16   Ht 5' 5\" (1.651 m)   Wt 167 lb (75.8 kg)   LMP 11/15/2019   SpO2 98%   BMI 27.79 kg/m²     The patient appears well, alert, oriented x 3, in no distress. ENT normal.  Neck supple. No adenopathy or thyromegaly. PAVAN. Lungs are clear, good air entry, no wheezes, rhonchi or rales. S1 and S2 normal, no murmurs, regular rate and rhythm. Abdomen soft without tenderness, guarding, mass or organomegaly. Extremities show no edema, normal peripheral pulses. Neurological is normal, no focal findings. BREAST EXAM: breasts appear normal, no suspicious masses, no skin or nipple changes or axillary nodes    PELVIC EXAM: normal external genitalia, vulva, vagina, cervix, uterus and adnexa    Assessment/Plan:    ICD-10-CM ICD-9-CM    1. Encounter for gynecological examination without abnormal finding  Z01.419 V72.31 PAP IG, APTIMA HPV AND RFX 16/18,45 ([id number])      PAP IG, APTIMA HPV AND RFX 16/18,45 ([id number])      2.  Encounter for immunization  Z23 V03.89 pneumococcal 20-john conj-dip, PF, (PREVNAR 20) 0.5 mL syrg injection      diphth,pertus,acell,,tetanus (Boostrix Tdap) 2.5-8-5 Lf-mcg-Lf/0.5mL susp suspension varicella-zoster recombinant, PF, (Shingrix, PF,) 50 mcg/0.5 mL susr injection      3. Benign essential HTN  I10 401.1             PAP  Prevnar 20, TDAP, Shingrix at pharmacy    Follow-up and Dispositions    Return in about 6 months (around 10/24/2023) for blood pressure. .      Reviewed plan of care. Patient has provided input and agrees with goals.

## 2023-04-24 NOTE — PROGRESS NOTES
Chief Complaint   Patient presents with    Well Woman     Well woman exam with pap no health concerns at this time

## 2023-05-02 ENCOUNTER — TELEPHONE (OUTPATIENT)
Dept: FAMILY MEDICINE CLINIC | Age: 58
End: 2023-05-02

## 2023-05-08 ENCOUNTER — TELEPHONE (OUTPATIENT)
Facility: CLINIC | Age: 58
End: 2023-05-08

## 2023-05-12 LAB
CYTOLOGIST CVX/VAG CYTO: NORMAL
CYTOLOGY CVX/VAG DOC CYTO: NORMAL
DX ICD CODE: NORMAL
OTHER STN SPEC: NORMAL
STAT OF ADQ CVX/VAG CYTO-IMP: NORMAL

## 2023-05-17 LAB
CYTOLOGIST CVX/VAG CYTO: NORMAL
CYTOLOGY CVX/VAG DOC CYTO: NORMAL
CYTOLOGY CVX/VAG DOC THIN PREP: NORMAL
DX ICD CODE: NORMAL
HPV GENOTYPE REFLEX: NORMAL
HPV I/H RISK 4 DNA CVX QL PROBE+SIG AMP: NEGATIVE
Lab: NORMAL
OTHER STN SPEC: NORMAL
PATHOLOGIST CVX/VAG CYTO: NORMAL
SPECIMEN STATUS REPORT: NORMAL
STAT OF ADQ CVX/VAG CYTO-IMP: NORMAL

## 2023-05-24 ENCOUNTER — PATIENT MESSAGE (OUTPATIENT)
Facility: CLINIC | Age: 58
End: 2023-05-24

## 2023-05-25 RX ORDER — HYDROCHLOROTHIAZIDE 25 MG/1
25 TABLET ORAL DAILY
Qty: 90 TABLET | Refills: 3 | Status: SHIPPED | OUTPATIENT
Start: 2023-05-25

## 2023-05-25 NOTE — TELEPHONE ENCOUNTER
From: Jud Sylvester  To: Dr. Nina Curry: 5/24/2023 6:51 PM EDT  Subject: Refill    I tried to request a refill for the HydroChlorothiazide but it said I could not do it in [address] [facility] was supposed to call to get it refilled.  I

## 2023-09-10 RX ORDER — POTASSIUM CHLORIDE 1500 MG/1
TABLET, EXTENDED RELEASE ORAL
Qty: 90 TABLET | Refills: 1 | Status: SHIPPED | OUTPATIENT
Start: 2023-09-10

## 2023-10-27 SDOH — ECONOMIC STABILITY: FOOD INSECURITY: WITHIN THE PAST 12 MONTHS, YOU WORRIED THAT YOUR FOOD WOULD RUN OUT BEFORE YOU GOT MONEY TO BUY MORE.: NEVER TRUE

## 2023-10-27 SDOH — ECONOMIC STABILITY: FOOD INSECURITY: WITHIN THE PAST 12 MONTHS, THE FOOD YOU BOUGHT JUST DIDN'T LAST AND YOU DIDN'T HAVE MONEY TO GET MORE.: NEVER TRUE

## 2023-10-27 SDOH — ECONOMIC STABILITY: INCOME INSECURITY: HOW HARD IS IT FOR YOU TO PAY FOR THE VERY BASICS LIKE FOOD, HOUSING, MEDICAL CARE, AND HEATING?: NOT HARD AT ALL

## 2023-10-27 SDOH — ECONOMIC STABILITY: HOUSING INSECURITY
IN THE LAST 12 MONTHS, WAS THERE A TIME WHEN YOU DID NOT HAVE A STEADY PLACE TO SLEEP OR SLEPT IN A SHELTER (INCLUDING NOW)?: NO

## 2023-10-27 SDOH — ECONOMIC STABILITY: TRANSPORTATION INSECURITY
IN THE PAST 12 MONTHS, HAS LACK OF TRANSPORTATION KEPT YOU FROM MEETINGS, WORK, OR FROM GETTING THINGS NEEDED FOR DAILY LIVING?: NO

## 2023-10-30 ENCOUNTER — OFFICE VISIT (OUTPATIENT)
Facility: CLINIC | Age: 58
End: 2023-10-30
Payer: MEDICAID

## 2023-10-30 VITALS
SYSTOLIC BLOOD PRESSURE: 130 MMHG | DIASTOLIC BLOOD PRESSURE: 84 MMHG | HEART RATE: 63 BPM | BODY MASS INDEX: 25.33 KG/M2 | TEMPERATURE: 97.5 F | OXYGEN SATURATION: 100 % | HEIGHT: 65 IN | WEIGHT: 152 LBS

## 2023-10-30 DIAGNOSIS — I10 BENIGN ESSENTIAL HTN: Primary | ICD-10-CM

## 2023-10-30 DIAGNOSIS — E78.00 HYPERCHOLESTEREMIA: ICD-10-CM

## 2023-10-30 DIAGNOSIS — I10 BENIGN ESSENTIAL HTN: ICD-10-CM

## 2023-10-30 PROCEDURE — 3075F SYST BP GE 130 - 139MM HG: CPT | Performed by: FAMILY MEDICINE

## 2023-10-30 PROCEDURE — 3079F DIAST BP 80-89 MM HG: CPT | Performed by: FAMILY MEDICINE

## 2023-10-30 PROCEDURE — 99214 OFFICE O/P EST MOD 30 MIN: CPT | Performed by: FAMILY MEDICINE

## 2023-10-30 ASSESSMENT — PATIENT HEALTH QUESTIONNAIRE - PHQ9
2. FEELING DOWN, DEPRESSED OR HOPELESS: 0
SUM OF ALL RESPONSES TO PHQ9 QUESTIONS 1 & 2: 0
1. LITTLE INTEREST OR PLEASURE IN DOING THINGS: 0
SUM OF ALL RESPONSES TO PHQ QUESTIONS 1-9: 0

## 2023-10-30 ASSESSMENT — ENCOUNTER SYMPTOMS: SHORTNESS OF BREATH: 0

## 2023-10-31 LAB
ALBUMIN SERPL-MCNC: 3.8 G/DL (ref 3.5–5)
ALBUMIN/GLOB SERPL: 1 (ref 1.1–2.2)
ALP SERPL-CCNC: 94 U/L (ref 45–117)
ALT SERPL-CCNC: 20 U/L (ref 12–78)
ANION GAP SERPL CALC-SCNC: 4 MMOL/L (ref 5–15)
AST SERPL-CCNC: 16 U/L (ref 15–37)
BILIRUB DIRECT SERPL-MCNC: <0.1 MG/DL (ref 0–0.2)
BILIRUB SERPL-MCNC: 0.4 MG/DL (ref 0.2–1)
BUN SERPL-MCNC: 10 MG/DL (ref 6–20)
BUN/CREAT SERPL: 13 (ref 12–20)
CALCIUM SERPL-MCNC: 9 MG/DL (ref 8.5–10.1)
CHLORIDE SERPL-SCNC: 106 MMOL/L (ref 97–108)
CHOLEST SERPL-MCNC: 208 MG/DL
CO2 SERPL-SCNC: 30 MMOL/L (ref 21–32)
CREAT SERPL-MCNC: 0.77 MG/DL (ref 0.55–1.02)
GLOBULIN SER CALC-MCNC: 3.8 G/DL (ref 2–4)
GLUCOSE SERPL-MCNC: 94 MG/DL (ref 65–100)
HDLC SERPL-MCNC: 86 MG/DL
HDLC SERPL: 2.4 (ref 0–5)
LDLC SERPL CALC-MCNC: 105.6 MG/DL (ref 0–100)
POTASSIUM SERPL-SCNC: 3.3 MMOL/L (ref 3.5–5.1)
PROT SERPL-MCNC: 7.6 G/DL (ref 6.4–8.2)
SODIUM SERPL-SCNC: 140 MMOL/L (ref 136–145)
TRIGL SERPL-MCNC: 82 MG/DL
VLDLC SERPL CALC-MCNC: 16.4 MG/DL

## 2024-01-18 NOTE — PROGRESS NOTES
Chief Complaint   Patient presents with    Hypertension     follow up      Pt is completing virtual  appointment at home in [place], [place]. Stable on Celexa  Continue

## 2024-03-05 RX ORDER — POTASSIUM CHLORIDE 1500 MG/1
TABLET, EXTENDED RELEASE ORAL
Qty: 90 TABLET | Refills: 1 | Status: SHIPPED | OUTPATIENT
Start: 2024-03-05

## 2024-04-25 ENCOUNTER — TELEPHONE (OUTPATIENT)
Facility: CLINIC | Age: 59
End: 2024-04-25

## 2024-04-30 RX ORDER — HYDROCHLOROTHIAZIDE 25 MG/1
25 TABLET ORAL DAILY
Qty: 90 TABLET | Refills: 1 | Status: SHIPPED | OUTPATIENT
Start: 2024-04-30

## 2024-04-30 NOTE — TELEPHONE ENCOUNTER
Pt has new pt appt on 08/21  [facility] faxed request [facility][id number]    HCTZ 25MG     QTY 90    Take 1 tablet by mouth every day

## 2024-06-05 ENCOUNTER — TELEPHONE (OUTPATIENT)
Facility: CLINIC | Age: 59
End: 2024-06-05

## 2024-08-20 SDOH — HEALTH STABILITY: PHYSICAL HEALTH: ON AVERAGE, HOW MANY MINUTES DO YOU ENGAGE IN EXERCISE AT THIS LEVEL?: 30 MIN

## 2024-08-20 SDOH — HEALTH STABILITY: PHYSICAL HEALTH: ON AVERAGE, HOW MANY DAYS PER WEEK DO YOU ENGAGE IN MODERATE TO STRENUOUS EXERCISE (LIKE A BRISK WALK)?: 7 DAYS

## 2024-08-21 ENCOUNTER — PATIENT MESSAGE (OUTPATIENT)
Facility: CLINIC | Age: 59
End: 2024-08-21

## 2024-08-21 ENCOUNTER — TELEMEDICINE (OUTPATIENT)
Facility: CLINIC | Age: 59
End: 2024-08-21
Payer: COMMERCIAL

## 2024-08-21 DIAGNOSIS — J45.20 MILD INTERMITTENT ASTHMA WITHOUT COMPLICATION: ICD-10-CM

## 2024-08-21 DIAGNOSIS — I10 BENIGN ESSENTIAL HTN: Primary | ICD-10-CM

## 2024-08-21 DIAGNOSIS — E78.00 HYPERCHOLESTEREMIA: ICD-10-CM

## 2024-08-21 DIAGNOSIS — Z12.11 COLON CANCER SCREENING: ICD-10-CM

## 2024-08-21 DIAGNOSIS — E66.3 OVERWEIGHT: ICD-10-CM

## 2024-08-21 DIAGNOSIS — Z12.31 BREAST CANCER SCREENING BY MAMMOGRAM: ICD-10-CM

## 2024-08-21 DIAGNOSIS — Z11.4 SCREENING FOR HIV WITHOUT PRESENCE OF RISK FACTORS: ICD-10-CM

## 2024-08-21 PROCEDURE — 99214 OFFICE O/P EST MOD 30 MIN: CPT | Performed by: FAMILY MEDICINE

## 2024-08-21 RX ORDER — ALBUTEROL SULFATE 90 UG/1
2 AEROSOL, METERED RESPIRATORY (INHALATION) EVERY 4 HOURS PRN
Qty: 18 G | Refills: 1 | Status: SHIPPED | OUTPATIENT
Start: 2024-08-21

## 2024-08-21 RX ORDER — POTASSIUM CHLORIDE 20 MEQ/1
20 TABLET, EXTENDED RELEASE ORAL DAILY
Qty: 90 TABLET | Refills: 1 | Status: SHIPPED | OUTPATIENT
Start: 2024-08-21

## 2024-08-21 NOTE — ASSESSMENT & PLAN NOTE
Well-controlled, continue current medications    Orders:    albuterol sulfate HFA (PROVENTIL;VENTOLIN;PROAIR) 108 (90 Base) MCG/ACT inhaler; Inhale 2 puffs into the lungs every 4 hours as needed for Wheezing

## 2024-08-21 NOTE — ASSESSMENT & PLAN NOTE
Well-controlled, continue current medications pending work up below    Orders:    potassium chloride (KLOR-CON M20) 20 MEQ extended release tablet; Take 1 tablet by mouth daily    CBC; Future    Comprehensive Metabolic Panel; Future    Thyroid Cascade Profile; Future

## 2024-08-21 NOTE — PROGRESS NOTES
Nimesh Colbert, was evaluated through a synchronous (real-time) audio-video encounter. The patient (or guardian if applicable) is aware that this is a billable service, which includes applicable co-pays. This Virtual Visit was conducted with patient's (and/or legal guardian's) consent. Patient identification was verified, and a caregiver was present when appropriate.   The patient was located at Home: [address] [place] [address] [place] [address]  Provider was located at Facility (Appt Dept): [address] [place] [address] [address]  [place] [address]  Confirm you are appropriately licensed, registered, or certified to deliver care in the state where the patient is located as indicated above. If you are not or unsure, please re-schedule the visit: Yes, I confirm.     Nimesh Colbert (:  1965) is a Established patient, presenting virtually for evaluation of the following:      Below is the assessment and plan developed based on review of pertinent history, physical exam, labs, studies, and medications.     Assessment & Plan  Benign essential HTN   Well-controlled, continue current medications pending work up below    Orders:    potassium chloride (KLOR-CON M20) 20 MEQ extended release tablet; Take 1 tablet by mouth daily    CBC; Future    Comprehensive Metabolic Panel; Future    Thyroid Cascade Profile; Future    Hypercholesteremia   Unclear control, continue current plan pending work up below    Orders:    Lipid Panel; Future    Mild intermittent asthma without complication       Orders:    albuterol sulfate HFA (PROVENTIL;VENTOLIN;PROAIR) 108 (90 Base) MCG/ACT inhaler; Inhale 2 puffs into the lungs every 4 hours as needed for Wheezing    Overweight       Orders:    Hemoglobin A1C; Future    Colon cancer screening       Orders:    Amb External Referral To Gastroenterology    Breast cancer screening by mammogram       Orders:    CAROL DIGITAL SCREEN W OR WO CAD BILATERAL; Future    Screening for HIV without

## 2024-08-21 NOTE — PROGRESS NOTES
Chief Complaint   Patient presents with    New Patient     New pt from Dr. Lopez. SprayCool is pretty good.  Hotflashes are especially bad at night.  Other than that doing good.      Medication Refill

## 2024-08-30 DIAGNOSIS — I10 BENIGN ESSENTIAL HTN: ICD-10-CM

## 2024-08-30 DIAGNOSIS — E66.3 OVERWEIGHT: ICD-10-CM

## 2024-08-30 DIAGNOSIS — Z11.4 SCREENING FOR HIV WITHOUT PRESENCE OF RISK FACTORS: ICD-10-CM

## 2024-08-30 DIAGNOSIS — E78.00 HYPERCHOLESTEREMIA: ICD-10-CM

## 2024-08-30 LAB
ALBUMIN SERPL-MCNC: 3.9 G/DL (ref 3.5–5)
ALBUMIN/GLOB SERPL: 1.1 (ref 1.1–2.2)
ALP SERPL-CCNC: 99 U/L (ref 45–117)
ALT SERPL-CCNC: 22 U/L (ref 12–78)
ANION GAP SERPL CALC-SCNC: 7 MMOL/L (ref 5–15)
AST SERPL-CCNC: 17 U/L (ref 15–37)
BILIRUB SERPL-MCNC: 0.5 MG/DL (ref 0.2–1)
BUN SERPL-MCNC: 14 MG/DL (ref 6–20)
BUN/CREAT SERPL: 17 (ref 12–20)
CALCIUM SERPL-MCNC: 9.6 MG/DL (ref 8.5–10.1)
CHLORIDE SERPL-SCNC: 106 MMOL/L (ref 97–108)
CHOLEST SERPL-MCNC: 219 MG/DL
CO2 SERPL-SCNC: 29 MMOL/L (ref 21–32)
CREAT SERPL-MCNC: 0.83 MG/DL (ref 0.55–1.02)
ERYTHROCYTE [DISTWIDTH] IN BLOOD BY AUTOMATED COUNT: 13.9 % (ref 11.5–14.5)
EST. AVERAGE GLUCOSE BLD GHB EST-MCNC: 100 MG/DL
GLOBULIN SER CALC-MCNC: 3.4 G/DL (ref 2–4)
GLUCOSE SERPL-MCNC: 88 MG/DL (ref 65–100)
HBA1C MFR BLD: 5.1 % (ref 4–5.6)
HCT VFR BLD AUTO: 40.8 % (ref 35–47)
HDLC SERPL-MCNC: 101 MG/DL
HDLC SERPL: 2.2 (ref 0–5)
HGB BLD-MCNC: 12.9 G/DL (ref 11.5–16)
HIV 1+2 AB+HIV1 P24 AG SERPL QL IA: NONREACTIVE
HIV 1/2 RESULT COMMENT: NORMAL
LDLC SERPL CALC-MCNC: 108 MG/DL (ref 0–100)
MCH RBC QN AUTO: 23.2 PG (ref 26–34)
MCHC RBC AUTO-ENTMCNC: 31.6 G/DL (ref 30–36.5)
MCV RBC AUTO: 73.5 FL (ref 80–99)
NRBC # BLD: 0 K/UL (ref 0–0.01)
NRBC BLD-RTO: 0 PER 100 WBC
PLATELET # BLD AUTO: 194 K/UL (ref 150–400)
PMV BLD AUTO: 11.3 FL (ref 8.9–12.9)
POTASSIUM SERPL-SCNC: 3.6 MMOL/L (ref 3.5–5.1)
PROT SERPL-MCNC: 7.3 G/DL (ref 6.4–8.2)
RBC # BLD AUTO: 5.55 M/UL (ref 3.8–5.2)
SODIUM SERPL-SCNC: 142 MMOL/L (ref 136–145)
TRIGL SERPL-MCNC: 50 MG/DL
VLDLC SERPL CALC-MCNC: 10 MG/DL
WBC # BLD AUTO: 4.6 K/UL (ref 3.6–11)

## 2024-08-31 LAB — TSH SERPL DL<=0.05 MIU/L-ACNC: 2.19 UIU/ML (ref 0.45–4.5)

## 2024-09-16 ENCOUNTER — HOSPITAL ENCOUNTER (OUTPATIENT)
Facility: HOSPITAL | Age: 59
Discharge: HOME | End: 2024-09-19
Payer: COMMERCIAL

## 2024-09-16 DIAGNOSIS — Z12.31 BREAST CANCER SCREENING BY MAMMOGRAM: ICD-10-CM

## 2024-09-16 PROCEDURE — 77063 BREAST TOMOSYNTHESIS BI: CPT

## 2024-09-16 PROCEDURE — 77067 SCR MAMMO BI INCL CAD: CPT

## 2024-10-28 NOTE — TELEPHONE ENCOUNTER
Called and spoke with pt, and she has been advised and states understanding of results.
Please call patient and let her know her ultrasound is normal..
Statement Selected

## 2024-11-07 RX ORDER — HYDROCHLOROTHIAZIDE 25 MG/1
25 TABLET ORAL DAILY
Qty: 90 TABLET | Refills: 3 | Status: SHIPPED | OUTPATIENT
Start: 2024-11-07

## 2024-12-18 ENCOUNTER — OFFICE VISIT (OUTPATIENT)
Facility: CLINIC | Age: 59
End: 2024-12-18
Payer: COMMERCIAL

## 2024-12-18 VITALS
TEMPERATURE: 97.3 F | HEIGHT: 65 IN | BODY MASS INDEX: 25.49 KG/M2 | OXYGEN SATURATION: 99 % | DIASTOLIC BLOOD PRESSURE: 95 MMHG | WEIGHT: 153 LBS | SYSTOLIC BLOOD PRESSURE: 138 MMHG | HEART RATE: 71 BPM

## 2024-12-18 DIAGNOSIS — J35.8 TONSIL STONE: Primary | ICD-10-CM

## 2024-12-18 LAB
GROUP A STREP ANTIGEN, POC: NEGATIVE
VALID INTERNAL CONTROL, POC: NORMAL

## 2024-12-18 PROCEDURE — 3075F SYST BP GE 130 - 139MM HG: CPT

## 2024-12-18 PROCEDURE — 87880 STREP A ASSAY W/OPTIC: CPT

## 2024-12-18 PROCEDURE — 3080F DIAST BP >= 90 MM HG: CPT

## 2024-12-18 PROCEDURE — 99213 OFFICE O/P EST LOW 20 MIN: CPT

## 2024-12-18 SDOH — ECONOMIC STABILITY: FOOD INSECURITY: WITHIN THE PAST 12 MONTHS, YOU WORRIED THAT YOUR FOOD WOULD RUN OUT BEFORE YOU GOT MONEY TO BUY MORE.: NEVER TRUE

## 2024-12-18 SDOH — ECONOMIC STABILITY: FOOD INSECURITY: WITHIN THE PAST 12 MONTHS, THE FOOD YOU BOUGHT JUST DIDN'T LAST AND YOU DIDN'T HAVE MONEY TO GET MORE.: NEVER TRUE

## 2024-12-18 SDOH — ECONOMIC STABILITY: INCOME INSECURITY: HOW HARD IS IT FOR YOU TO PAY FOR THE VERY BASICS LIKE FOOD, HOUSING, MEDICAL CARE, AND HEATING?: NOT VERY HARD

## 2024-12-18 ASSESSMENT — PATIENT HEALTH QUESTIONNAIRE - PHQ9
1. LITTLE INTEREST OR PLEASURE IN DOING THINGS: NOT AT ALL
SUM OF ALL RESPONSES TO PHQ9 QUESTIONS 1 & 2: 0
SUM OF ALL RESPONSES TO PHQ QUESTIONS 1-9: 0
2. FEELING DOWN, DEPRESSED OR HOPELESS: NOT AT ALL
SUM OF ALL RESPONSES TO PHQ QUESTIONS 1-9: 0

## 2024-12-18 NOTE — PROGRESS NOTES
Chief Complaint   Patient presents with    Ear Pain    Pharyngitis     \"Have you been to the ER, urgent care clinic since your last visit?  Hospitalized since your last visit?\"    NO    “Have you seen or consulted any other health care providers outside of [facility] since your last visit?”    NO        “Have you had a colorectal cancer screening such as a colonoscopy/FIT/Cologuard?    NO    Date of last Colonoscopy: 7/26/2017  No cologuard on file  Date of last FIT: 10/28/2019   No flexible sigmoidoscopy on file         Click Here for Release of Records Request

## 2024-12-18 NOTE — PROGRESS NOTES
Family Medicine Office Visit  Patient: Nimesh Colbert  1965, 59 y.o., female  Encounter Date: 12/18/2024      CHIEF COMPLAINT  Chief Complaint   Patient presents with    Ear Pain     Patient stated ongoing for a week.    Pharyngitis       SUBJECTIVE  Nimesh Colbert is a 59 y.o. female with presenting today for left sided throat and ear pain x 1 week. Patient reports it started off with her left sided throat pain, then her left ear started hurting. She has taken otc cold medicine, benadryl without relief. She denies congestion, cough, CP, SOB, hearing loss, fever, chills, or ear drainage.      Review of Systems   All other systems reviewed and are negative.       Social History     Tobacco Use   Smoking Status Never   Smokeless Tobacco Never     Social History     Substance and Sexual Activity   Alcohol Use No     Social History     Substance and Sexual Activity   Drug Use No     Social History     Substance and Sexual Activity   Sexual Activity Yes    Partners: Male    Birth control/protection: Pill       Social Determinants of Health with Concerns     Alcohol Use: Not on file   Transportation Needs: Unknown (12/18/2024)    PRAPARE - Transportation     Lack of Transportation (Medical): Not on file     Lack of Transportation (Non-Medical): No   Stress: Not on file   Social Connections: Not on file   Intimate Partner Violence: Not on file   Housing Stability: Unknown (12/18/2024)    Housing Stability Vital Sign     Unable to Pay for Housing in the Last Year: Not on file     Number of Times Moved in the Last Year: Not on file     Homeless in the Last Year: No   Interpersonal Safety: Not on file   Utilities: Not on file       HISTORICAL  Reviewed and updated today, and as noted below:    Past Medical History:   Diagnosis Date    Allergic rhinitis, cause unspecified 11/28/2012    Asthma 09/05/2012    Benign essential HTN 06/10/2015    Hypercholesteremia 09/05/2012    Overweight 02/15/2016    Sickle cell trait (HCC)

## 2025-02-19 DIAGNOSIS — I10 BENIGN ESSENTIAL HTN: ICD-10-CM

## 2025-02-19 RX ORDER — POTASSIUM CHLORIDE 1500 MG/1
20 TABLET, EXTENDED RELEASE ORAL DAILY
Qty: 90 TABLET | Refills: 1 | Status: SHIPPED | OUTPATIENT
Start: 2025-02-19

## 2025-02-23 SDOH — ECONOMIC STABILITY: FOOD INSECURITY: WITHIN THE PAST 12 MONTHS, THE FOOD YOU BOUGHT JUST DIDN'T LAST AND YOU DIDN'T HAVE MONEY TO GET MORE.: NEVER TRUE

## 2025-02-23 SDOH — ECONOMIC STABILITY: INCOME INSECURITY: IN THE LAST 12 MONTHS, WAS THERE A TIME WHEN YOU WERE NOT ABLE TO PAY THE MORTGAGE OR RENT ON TIME?: NO

## 2025-02-23 SDOH — ECONOMIC STABILITY: FOOD INSECURITY: WITHIN THE PAST 12 MONTHS, YOU WORRIED THAT YOUR FOOD WOULD RUN OUT BEFORE YOU GOT MONEY TO BUY MORE.: NEVER TRUE

## 2025-02-23 SDOH — ECONOMIC STABILITY: TRANSPORTATION INSECURITY
IN THE PAST 12 MONTHS, HAS THE LACK OF TRANSPORTATION KEPT YOU FROM MEDICAL APPOINTMENTS OR FROM GETTING MEDICATIONS?: NO

## 2025-02-24 ENCOUNTER — OFFICE VISIT (OUTPATIENT)
Facility: CLINIC | Age: 60
End: 2025-02-24
Payer: COMMERCIAL

## 2025-02-24 VITALS
BODY MASS INDEX: 24.99 KG/M2 | SYSTOLIC BLOOD PRESSURE: 116 MMHG | HEIGHT: 65 IN | DIASTOLIC BLOOD PRESSURE: 84 MMHG | RESPIRATION RATE: 20 BRPM | HEART RATE: 74 BPM | TEMPERATURE: 97.7 F | OXYGEN SATURATION: 98 % | WEIGHT: 150 LBS

## 2025-02-24 DIAGNOSIS — E66.3 OVERWEIGHT: ICD-10-CM

## 2025-02-24 DIAGNOSIS — E78.00 HYPERCHOLESTEREMIA: ICD-10-CM

## 2025-02-24 DIAGNOSIS — Z12.11 COLON CANCER SCREENING: ICD-10-CM

## 2025-02-24 DIAGNOSIS — Z13.1 DIABETES MELLITUS SCREENING: ICD-10-CM

## 2025-02-24 DIAGNOSIS — J45.20 MILD INTERMITTENT ASTHMA WITHOUT COMPLICATION: ICD-10-CM

## 2025-02-24 DIAGNOSIS — I10 BENIGN ESSENTIAL HTN: Primary | ICD-10-CM

## 2025-02-24 PROCEDURE — 3079F DIAST BP 80-89 MM HG: CPT | Performed by: FAMILY MEDICINE

## 2025-02-24 PROCEDURE — 99214 OFFICE O/P EST MOD 30 MIN: CPT | Performed by: FAMILY MEDICINE

## 2025-02-24 PROCEDURE — 3074F SYST BP LT 130 MM HG: CPT | Performed by: FAMILY MEDICINE

## 2025-02-24 NOTE — PROGRESS NOTES
Chief Complaint   Patient presents with    Follow-up     6 month f/up.     \"Have you been to the ER, urgent care clinic since your last visit?  Hospitalized since your last visit?\"    {YES/NO:[id number]}    “Have you seen or consulted any other health care providers outside of [facility] since your last visit?”    {YES/NO:[id number]}        “Have you had a colorectal cancer screening such as a colonoscopy/FIT/Cologuard?    {Yes/No:[id number]}    Date of last Colonoscopy: 7/26/2017  No cologuard on file  Date of last FIT: 10/28/2019   No flexible sigmoidoscopy on file         Click Here for Release of Records Request

## 2025-02-24 NOTE — PROGRESS NOTES
Assessment & Plan  1. Hypertension: Blood pressure improved from the 130s to the 110s and 120s. Weight loss of 3 pounds since last visit.  - Continue hydrochlorothiazide 25 mg and potassium 20 mEq daily  - Continue dietary modifications and physical activity  - If lightheadedness occurs, reduce hydrochlorothiazide dosage by half while continuing potassium    2. Asthma: Well-managed.  - Continue incorporating raw honey into diet    3. Tonsillitis: Tonsil stones can lead to tonsillitis due to bacterial irritation.  - Continue current management    4. Health maintenance: Referral for colonoscopy provided.  - Schedule procedure before next visit  - Order lab tests to be completed one week before next appointment    Follow-up  - Scheduled for a follow-up visit in 6 months for annual physical examination    PROCEDURE  Procedure Performed  Colonoscopy    Date  2017    It was a pleasure seeing Ms. Nimesh Colbert today.  Return in about 6 months (around 8/24/2025) for Annual physical.    History of Present Illness  The patient is a 59-year-old female presenting for a 6-month follow-up for chronic disease management.    Hypertension  - Manages through dietary modifications and regular physical activity, including daily 30-minute sessions on the August machine and participating in group walks at her workplace.  - Adheres to antihypertensive medication regimen.  - Made dietary changes, such as reducing salt intake, increasing consumption of fruits and vegetables, and choosing healthier snacks.  - Gained 3 pounds since last visit in August 2024 but reports experiencing weight fluctuations.  - Occasionally experiences lightheadedness, attributed to inadequate hydration.  - Has not been consistently monitoring blood pressure at home.  - Current medications include hydrochlorothiazide 25 mg and potassium 20 mEq daily.    Asthma  - Well-controlled.  - Incorporates raw honey into diet as a complementary measure.    Tonsilloliths and

## 2025-02-24 NOTE — PROGRESS NOTES
Chief Complaint   Patient presents with    Follow-up     6 month f/up.     \"Have you been to the ER, urgent care clinic since your last visit?  Hospitalized since your last visit?\"    NO    “Have you seen or consulted any other health care providers outside of [facility] since your last visit?”    NO        “Have you had a colorectal cancer screening such as a colonoscopy/FIT/Cologuard?    NO    Date of last Colonoscopy: 7/26/2017  No cologuard on file  Date of last FIT: 10/28/2019   No flexible sigmoidoscopy on file         Click Here for Release of Records Request

## 2025-08-20 DIAGNOSIS — I10 BENIGN ESSENTIAL HTN: ICD-10-CM

## 2025-08-20 DIAGNOSIS — Z13.1 DIABETES MELLITUS SCREENING: ICD-10-CM

## 2025-08-20 DIAGNOSIS — E78.00 HYPERCHOLESTEREMIA: ICD-10-CM

## 2025-08-20 LAB
ALBUMIN SERPL-MCNC: 4 G/DL (ref 3.5–5.2)
ALBUMIN/GLOB SERPL: 1.1 (ref 1.1–2.2)
ALP SERPL-CCNC: 95 U/L (ref 35–104)
ALT SERPL-CCNC: 15 U/L (ref 10–35)
ANION GAP SERPL CALC-SCNC: 10 MMOL/L (ref 2–14)
AST SERPL-CCNC: 21 U/L (ref 10–35)
BILIRUB SERPL-MCNC: 0.4 MG/DL (ref 0–1.2)
BUN SERPL-MCNC: 8 MG/DL (ref 6–20)
BUN/CREAT SERPL: 11 (ref 12–20)
CALCIUM SERPL-MCNC: 9.5 MG/DL (ref 8.6–10)
CHLORIDE SERPL-SCNC: 101 MMOL/L (ref 98–107)
CO2 SERPL-SCNC: 31 MMOL/L (ref 20–29)
CREAT SERPL-MCNC: 0.73 MG/DL (ref 0.6–1)
CREAT UR-MCNC: 90.8 MG/DL (ref 28–217)
ERYTHROCYTE [DISTWIDTH] IN BLOOD BY AUTOMATED COUNT: 13.8 % (ref 11.5–14.5)
EST. AVERAGE GLUCOSE BLD GHB EST-MCNC: 115 MG/DL
GLOBULIN SER CALC-MCNC: 3.5 G/DL (ref 2–4)
GLUCOSE SERPL-MCNC: 86 MG/DL (ref 65–100)
HBA1C MFR BLD: 5.6 % (ref 4–5.6)
HCT VFR BLD AUTO: 41 % (ref 35–47)
HGB BLD-MCNC: 12.9 G/DL (ref 11.5–16)
MCH RBC QN AUTO: 22.7 PG (ref 26–34)
MCHC RBC AUTO-ENTMCNC: 31.5 G/DL (ref 30–36.5)
MCV RBC AUTO: 72.2 FL (ref 80–99)
MICROALBUMIN UR-MCNC: <1.2 MG/DL
MICROALBUMIN/CREAT UR-RTO: NORMAL MG/G
NRBC # BLD: 0 K/UL (ref 0–0.01)
NRBC BLD-RTO: 0 PER 100 WBC
PLATELET # BLD AUTO: 208 K/UL (ref 150–400)
PMV BLD AUTO: 11.8 FL (ref 8.9–12.9)
POTASSIUM SERPL-SCNC: 3.3 MMOL/L (ref 3.5–5.1)
PROT SERPL-MCNC: 7.5 G/DL (ref 6.4–8.3)
RBC # BLD AUTO: 5.68 M/UL (ref 3.8–5.2)
SODIUM SERPL-SCNC: 142 MMOL/L (ref 136–145)
WBC # BLD AUTO: 4.6 K/UL (ref 3.6–11)

## 2025-08-21 ENCOUNTER — RESULTS FOLLOW-UP (OUTPATIENT)
Facility: CLINIC | Age: 60
End: 2025-08-21

## 2025-08-21 DIAGNOSIS — E87.6 HYPOKALEMIA: Primary | ICD-10-CM

## 2025-08-21 DIAGNOSIS — I10 BENIGN ESSENTIAL HTN: ICD-10-CM

## 2025-08-21 LAB
CHOLEST SERPL-MCNC: 231 MG/DL (ref 100–199)
HDL SERPL-SCNC: 33.9 UMOL/L
HDLC SERPL-MCNC: 86 MG/DL
LDL SERPL QN: 21.9 NM
LDL SERPL-SCNC: 1182 NMOL/L
LDL SMALL SERPL-SCNC: <90 NMOL/L
LDLC SERPL CALC-MCNC: 133 MG/DL (ref 0–99)
LP-IR SCORE SERPL: <25
TRIGL SERPL-MCNC: 73 MG/DL (ref 0–149)
TSH SERPL DL<=0.05 MIU/L-ACNC: 2.29 UIU/ML (ref 0.45–4.5)

## 2025-08-22 LAB — LPA SERPL-SCNC: 9.1 NMOL/L

## 2025-08-25 DIAGNOSIS — E87.6 HYPOKALEMIA: ICD-10-CM

## 2025-08-25 DIAGNOSIS — I10 BENIGN ESSENTIAL HTN: ICD-10-CM

## 2025-08-25 LAB
ANION GAP SERPL CALC-SCNC: 9 MMOL/L (ref 2–14)
BUN SERPL-MCNC: 11 MG/DL (ref 6–20)
BUN/CREAT SERPL: 14 (ref 12–20)
CALCIUM SERPL-MCNC: 9.2 MG/DL (ref 8.6–10)
CHLORIDE SERPL-SCNC: 104 MMOL/L (ref 98–107)
CO2 SERPL-SCNC: 29 MMOL/L (ref 20–29)
CREAT SERPL-MCNC: 0.74 MG/DL (ref 0.6–1)
GLUCOSE SERPL-MCNC: 84 MG/DL (ref 65–100)
POTASSIUM SERPL-SCNC: 3.7 MMOL/L (ref 3.5–5.1)
SODIUM SERPL-SCNC: 142 MMOL/L (ref 136–145)

## 2025-08-27 ENCOUNTER — OFFICE VISIT (OUTPATIENT)
Facility: CLINIC | Age: 60
End: 2025-08-27
Payer: COMMERCIAL

## 2025-08-27 VITALS
TEMPERATURE: 97.2 F | OXYGEN SATURATION: 99 % | HEIGHT: 65 IN | WEIGHT: 152 LBS | SYSTOLIC BLOOD PRESSURE: 156 MMHG | BODY MASS INDEX: 25.33 KG/M2 | DIASTOLIC BLOOD PRESSURE: 91 MMHG | RESPIRATION RATE: 20 BRPM | HEART RATE: 54 BPM

## 2025-08-27 DIAGNOSIS — I10 BENIGN ESSENTIAL HTN: ICD-10-CM

## 2025-08-27 DIAGNOSIS — Z13.1 DIABETES MELLITUS SCREENING: ICD-10-CM

## 2025-08-27 DIAGNOSIS — E87.6 HYPOKALEMIA: ICD-10-CM

## 2025-08-27 DIAGNOSIS — E78.00 HYPERCHOLESTEREMIA: ICD-10-CM

## 2025-08-27 DIAGNOSIS — Z00.01 ENCOUNTER FOR WELL ADULT EXAM WITH ABNORMAL FINDINGS: Primary | ICD-10-CM

## 2025-08-27 DIAGNOSIS — E53.8 B12 DEFICIENCY: ICD-10-CM

## 2025-08-27 PROCEDURE — 99396 PREV VISIT EST AGE 40-64: CPT | Performed by: FAMILY MEDICINE

## 2025-08-27 PROCEDURE — 3077F SYST BP >= 140 MM HG: CPT | Performed by: FAMILY MEDICINE

## 2025-08-27 PROCEDURE — 3080F DIAST BP >= 90 MM HG: CPT | Performed by: FAMILY MEDICINE

## 2025-08-27 RX ORDER — OLMESARTAN MEDOXOMIL 20 MG/1
20 TABLET ORAL NIGHTLY
Qty: 90 TABLET | Refills: 1 | Status: SHIPPED | OUTPATIENT
Start: 2025-08-27

## 2025-08-27 ASSESSMENT — PATIENT HEALTH QUESTIONNAIRE - PHQ9
SUM OF ALL RESPONSES TO PHQ QUESTIONS 1-9: 0
SUM OF ALL RESPONSES TO PHQ QUESTIONS 1-9: 0
1. LITTLE INTEREST OR PLEASURE IN DOING THINGS: NOT AT ALL
SUM OF ALL RESPONSES TO PHQ QUESTIONS 1-9: 0
2. FEELING DOWN, DEPRESSED OR HOPELESS: NOT AT ALL
SUM OF ALL RESPONSES TO PHQ QUESTIONS 1-9: 0